# Patient Record
Sex: FEMALE | Race: WHITE | Employment: OTHER
[De-identification: names, ages, dates, MRNs, and addresses within clinical notes are randomized per-mention and may not be internally consistent; named-entity substitution may affect disease eponyms.]

---

## 2022-03-19 PROBLEM — R42 DIZZINESS: Status: ACTIVE | Noted: 2017-07-28

## 2022-03-19 PROBLEM — Z76.89 ESTABLISHING CARE WITH NEW DOCTOR, ENCOUNTER FOR: Status: ACTIVE | Noted: 2021-06-16

## 2022-03-19 PROBLEM — Z78.0 POST-MENOPAUSAL: Status: ACTIVE | Noted: 2021-06-16

## 2022-03-19 PROBLEM — I10 ESSENTIAL HYPERTENSION: Status: ACTIVE | Noted: 2017-07-28

## 2022-06-14 DIAGNOSIS — Z00.00 LABORATORY EXAM ORDERED AS PART OF ROUTINE GENERAL MEDICAL EXAMINATION: ICD-10-CM

## 2022-06-14 DIAGNOSIS — Z78.0 POST-MENOPAUSAL: Primary | ICD-10-CM

## 2022-06-14 DIAGNOSIS — E78.2 MIXED HYPERLIPIDEMIA: ICD-10-CM

## 2022-06-17 DIAGNOSIS — B00.1 FEVER BLISTER: Primary | ICD-10-CM

## 2022-06-17 RX ORDER — ACYCLOVIR 400 MG/1
400 TABLET ORAL 3 TIMES DAILY
Qty: 15 TABLET | Refills: 1 | Status: SHIPPED | OUTPATIENT
Start: 2022-06-17 | End: 2022-06-22

## 2022-06-21 ENCOUNTER — NURSE ONLY (OUTPATIENT)
Dept: INTERNAL MEDICINE CLINIC | Facility: CLINIC | Age: 57
End: 2022-06-21

## 2022-06-21 DIAGNOSIS — Z00.00 LABORATORY EXAM ORDERED AS PART OF ROUTINE GENERAL MEDICAL EXAMINATION: ICD-10-CM

## 2022-06-21 DIAGNOSIS — Z78.0 POST-MENOPAUSAL: ICD-10-CM

## 2022-06-21 DIAGNOSIS — E78.2 MIXED HYPERLIPIDEMIA: ICD-10-CM

## 2022-06-21 LAB
ALBUMIN SERPL-MCNC: 4.3 G/DL (ref 3.5–5)
ALBUMIN/GLOB SERPL: 1.3 {RATIO} (ref 1.2–3.5)
ALP SERPL-CCNC: 106 U/L (ref 50–136)
ALT SERPL-CCNC: 50 U/L (ref 12–65)
ANION GAP SERPL CALC-SCNC: 5 MMOL/L (ref 7–16)
APPEARANCE UR: CLEAR
AST SERPL-CCNC: 17 U/L (ref 15–37)
BACTERIA URNS QL MICRO: 0 /HPF
BASOPHILS # BLD: 0.1 K/UL (ref 0–0.2)
BASOPHILS NFR BLD: 1 % (ref 0–2)
BILIRUB SERPL-MCNC: 0.6 MG/DL (ref 0.2–1.1)
BILIRUB UR QL: NEGATIVE
BUN SERPL-MCNC: 9 MG/DL (ref 6–23)
CALCIUM SERPL-MCNC: 9.4 MG/DL (ref 8.3–10.4)
CASTS URNS QL MICRO: 0 /LPF
CHLORIDE SERPL-SCNC: 104 MMOL/L (ref 98–107)
CHOLEST SERPL-MCNC: 214 MG/DL
CO2 SERPL-SCNC: 30 MMOL/L (ref 21–32)
COLOR UR: NORMAL
CREAT SERPL-MCNC: 0.9 MG/DL (ref 0.6–1)
CRYSTALS URNS QL MICRO: 0 /LPF
DIFFERENTIAL METHOD BLD: ABNORMAL
EOSINOPHIL # BLD: 0.1 K/UL (ref 0–0.8)
EOSINOPHIL NFR BLD: 2 % (ref 0.5–7.8)
EPI CELLS #/AREA URNS HPF: NORMAL /HPF
ERYTHROCYTE [DISTWIDTH] IN BLOOD BY AUTOMATED COUNT: 12.2 % (ref 11.9–14.6)
GLOBULIN SER CALC-MCNC: 3.4 G/DL (ref 2.3–3.5)
GLUCOSE SERPL-MCNC: 98 MG/DL (ref 65–100)
GLUCOSE UR STRIP.AUTO-MCNC: NEGATIVE MG/DL
HCT VFR BLD AUTO: 47.4 % (ref 35.8–46.3)
HDLC SERPL-MCNC: 34 MG/DL (ref 40–60)
HDLC SERPL: 6.3 {RATIO}
HGB BLD-MCNC: 14.7 G/DL (ref 11.7–15.4)
HGB UR QL STRIP: NEGATIVE
IMM GRANULOCYTES # BLD AUTO: 0 K/UL (ref 0–0.5)
IMM GRANULOCYTES NFR BLD AUTO: 0 % (ref 0–5)
KETONES UR QL STRIP.AUTO: NEGATIVE MG/DL
LDLC SERPL CALC-MCNC: 134.8 MG/DL
LEUKOCYTE ESTERASE UR QL STRIP.AUTO: NEGATIVE
LYMPHOCYTES # BLD: 1.8 K/UL (ref 0.5–4.6)
LYMPHOCYTES NFR BLD: 24 % (ref 13–44)
MCH RBC QN AUTO: 28.7 PG (ref 26.1–32.9)
MCHC RBC AUTO-ENTMCNC: 31 G/DL (ref 31.4–35)
MCV RBC AUTO: 92.6 FL (ref 79.6–97.8)
MONOCYTES # BLD: 0.4 K/UL (ref 0.1–1.3)
MONOCYTES NFR BLD: 6 % (ref 4–12)
MUCOUS THREADS URNS QL MICRO: 0 /LPF
NEUTS SEG # BLD: 5 K/UL (ref 1.7–8.2)
NEUTS SEG NFR BLD: 67 % (ref 43–78)
NITRITE UR QL STRIP.AUTO: NEGATIVE
NRBC # BLD: 0 K/UL (ref 0–0.2)
OTHER OBSERVATIONS: NORMAL
PH UR STRIP: 7.5 [PH] (ref 5–9)
PLATELET # BLD AUTO: 332 K/UL (ref 150–450)
PMV BLD AUTO: 11.5 FL (ref 9.4–12.3)
POTASSIUM SERPL-SCNC: 4.3 MMOL/L (ref 3.5–5.1)
PROT SERPL-MCNC: 7.7 G/DL (ref 6.3–8.2)
PROT UR STRIP-MCNC: NEGATIVE MG/DL
RBC # BLD AUTO: 5.12 M/UL (ref 4.05–5.2)
RBC #/AREA URNS HPF: 0 /HPF
SODIUM SERPL-SCNC: 139 MMOL/L (ref 136–145)
SP GR UR REFRACTOMETRY: 1.01 (ref 1–1.02)
TRIGL SERPL-MCNC: 226 MG/DL (ref 35–150)
TSH, 3RD GENERATION: 1.26 UIU/ML (ref 0.36–3.74)
URINE CULTURE IF INDICATED: NORMAL
UROBILINOGEN UR QL STRIP.AUTO: 0.2 EU/DL (ref 0.2–1)
VLDLC SERPL CALC-MCNC: 45.2 MG/DL (ref 6–23)
WBC # BLD AUTO: 7.4 K/UL (ref 4.3–11.1)
WBC URNS QL MICRO: NORMAL /HPF

## 2022-06-28 ENCOUNTER — OFFICE VISIT (OUTPATIENT)
Dept: INTERNAL MEDICINE CLINIC | Facility: CLINIC | Age: 57
End: 2022-06-28
Payer: COMMERCIAL

## 2022-06-28 VITALS
WEIGHT: 178 LBS | TEMPERATURE: 98.6 F | HEIGHT: 62 IN | DIASTOLIC BLOOD PRESSURE: 84 MMHG | BODY MASS INDEX: 32.76 KG/M2 | HEART RATE: 76 BPM | SYSTOLIC BLOOD PRESSURE: 130 MMHG | OXYGEN SATURATION: 98 %

## 2022-06-28 DIAGNOSIS — I10 ESSENTIAL HYPERTENSION: ICD-10-CM

## 2022-06-28 DIAGNOSIS — Z82.79 FAMILY HISTORY OF NEUROFIBROMATOSIS: ICD-10-CM

## 2022-06-28 DIAGNOSIS — F40.240 CLAUSTROPHOBIA: ICD-10-CM

## 2022-06-28 DIAGNOSIS — H90.5 SENSORINEURAL HEARING LOSS (SNHL) OF RIGHT EAR, UNSPECIFIED HEARING STATUS ON CONTRALATERAL SIDE: ICD-10-CM

## 2022-06-28 DIAGNOSIS — Z12.31 ENCOUNTER FOR SCREENING MAMMOGRAM FOR BREAST CANCER: Primary | ICD-10-CM

## 2022-06-28 DIAGNOSIS — R42 VERTIGO: ICD-10-CM

## 2022-06-28 DIAGNOSIS — E78.2 MIXED HYPERLIPIDEMIA: ICD-10-CM

## 2022-06-28 PROCEDURE — 99214 OFFICE O/P EST MOD 30 MIN: CPT | Performed by: INTERNAL MEDICINE

## 2022-06-28 RX ORDER — ESTRADIOL 0.1 MG/D
1 FILM, EXTENDED RELEASE TRANSDERMAL
Qty: 8 PATCH | Refills: 3 | Status: SHIPPED | OUTPATIENT
Start: 2022-06-30

## 2022-06-28 RX ORDER — DIAZEPAM 2 MG/1
TABLET ORAL
Qty: 2 TABLET | Refills: 0 | Status: SHIPPED | OUTPATIENT
Start: 2022-06-28 | End: 2022-12-28

## 2022-06-28 RX ORDER — CETIRIZINE HYDROCHLORIDE 10 MG/1
TABLET ORAL
COMMUNITY

## 2022-06-28 ASSESSMENT — ENCOUNTER SYMPTOMS
SHORTNESS OF BREATH: 0
CHEST TIGHTNESS: 0

## 2022-06-28 NOTE — PROGRESS NOTES
ASSESSMENT/PLAN:         Evaluation and management of the chronic condition(s) delineated. No negative side effects reported. I have reviewed all the lab results. There are some abnormalities that are either expected or not critical to the patient's health, and are discussed in the office today and are addressed. Please refer to the above assessement and plan narrative and orders and follow up plan. Medication discussed and refilled as needed. Physical exam findings are stable unless otherwise indicated and this is addressed. The most recent lab work and imaging and consultant/urgent care visits and imaging are reviewed and discussed and considered during this visit encounter. 1. Encounter for screening mammogram for breast cancer     - SIMONE Digital Screen Bilateral [[id number]]; Future    2. Essential hypertension  Treatment regimen is effective. 3. Mixed hyperlipidemia  Declines statin    4. Sensorineural hearing loss (SNHL) of right ear, unspecified hearing status on contralateral side  Eval mri due to vertigo and right hearing loss and sister with Neurofibromatosis and acoustic neuroma. Evidence based review could consider genetic testing. She is not interested at this time.      - MRI brain with contrast; Future    5. Family history of neurofibromatosis     - MRI brain with contrast; Future    6. Vertigo     - MRI brain with contrast; Future    7. Claustrophobia     - diazePAM (VALIUM) 2 MG tablet; Take 1 hour prior to MRI and may repeat once for MRI claustrophobia  Dispense: 2 tablet; Refill: 0           On this date, 6/28/22, I have spent 30 minutes reviewing previous notes, test results and face to face with the patient discussing the diagnosis and importance of compliance with the treatment plan as well as documenting on the day of the visit. An electronic signature was used to authenticate this note.   -- Kelsea Reyes MD     Return in about 6 months (around 2022). SUBJECTIVE/OBJECTIVE:  Chief Complaint   Patient presents with    Hypertension    Cholesterol Problem    Results       HPI:   This is a  Established patientAna Doshi (: 1965 is a 60 y.o. female, here for evaluation of the following chief complaint(s):   Chief Complaint   Patient presents with    Hypertension    Cholesterol Problem    Results       Patient is here for follow-up and management of chronic medical conditions, review of recent labs, review of any imaging completed since our last office visit and discuss any consultants opinions or management changes. Here for medication refills and lab review. Not taking statin as prescribed. Father is living with her. His grief is a concern for her since her mother passed. Sister diagnosed with a brain tumor. Ongoing evaluation. This is a concern for her. She is now being treated at [facility]. Recently retired from [employer]. I had the pleasure of taking care of her mother prior to her passing. She is now pursuing some part-time work as a . Previously has seen Dr. Sagrario Pastor. She has a history of occasional vertigo in the right ear. Has seen ENT. Mild hearing changes. Sister with Acoustic neuroma and another family member with NFT. Stable on hormone patch. Prior hysterectomy. Released by gynecology follow-up as needed. Coronary calcium score of 0. Baseline cardiac evaluation has been done with [facility]    Completed Covid vaccine. Booster recommended. Labs reviewed and discussed and medication refilled as needed for chronic medications during ov or adjusted based on lab results and/or our discussion as appropriate. See discussion. The patient's available records and electronic chart records are reviewed.   The PMH, PSH, medications, allergies, medications, FH, health maintenance and vaccination status are all reviewed and updated as appropriate. Records from outside providers have been reviewed, summarized, and considered as noted in the history of present illness, past medical history, and objective data of this note and encounter. Health Maintenance   Topic Date Due    Breast cancer screen  Never done    Shingles vaccine (1 of 2) Never done    COVID-19 Vaccine (3 - Booster for Pfizer series) 09/06/2021    Hepatitis C screen  06/28/2023 (Originally 9/11/1983)    Flu vaccine (Season Ended) 09/01/2022    Depression Screen  12/16/2022    Lipids  06/21/2023    Colorectal Cancer Screen  08/28/2027    DTaP/Tdap/Td vaccine (2 - Td or Tdap) 06/07/2028    Hepatitis A vaccine  Aged Out    Hepatitis B vaccine  Aged Out    Hib vaccine  Aged Out    Meningococcal (ACWY) vaccine  Aged Out    Pneumococcal 0-64 years Vaccine  Aged Out    HIV screen  Discontinued    Cervical cancer screen  Discontinued     Patient Active Problem List   Diagnosis    Hyperlipidemia    Essential hypertension    Post-menopausal    Vertigo    Establishing care with new doctor, encounter for    Chest pain    Family history of neurofibromatosis    Claustrophobia       Review of Systems   Constitutional: Negative for activity change and fatigue. HENT: Positive for hearing loss. Eyes: Negative for visual disturbance. Respiratory: Negative for chest tightness and shortness of breath. Cardiovascular: Negative for chest pain, palpitations and leg swelling. Endocrine: Negative for polydipsia and polyuria. Genitourinary: Negative for dysuria. Musculoskeletal: Negative for myalgias. Skin: Negative for wound. Neurological: Positive for dizziness. Negative for headaches. Psychiatric/Behavioral: Negative for dysphoric mood.        Lab Results   Component Value Date    WBC 7.4 06/21/2022    HGB 14.7 06/21/2022    HCT 47.4 06/21/2022    MCV 92.6 06/21/2022    RDW 12.2 06/21/2022     06/21/2022    NEUTOPHILPCT 63 06/16/2021 LYMPHOPCT 24 06/21/2022    LYMPHOPCT 25 06/16/2021    MONOPCT 6 06/21/2022    MONOPCT 9 06/16/2021    EOSRELPCT 2 06/21/2022    BASOPCT 1 06/21/2022    BASOPCT 1 06/16/2021    LYMPHSABS 1.8 06/21/2022    LYMPHSABS 1.8 06/16/2021    MONOSABS 0.4 06/21/2022    MONOSABS 0.7 06/16/2021    EOSABS 0.1 06/21/2022    EOSABS 0.1 06/16/2021    BASOSABS 0.1 06/21/2022    IMMGRAN 0 06/21/2022    GRANULOCYTEABSOLUTECOUNT 0.0 06/16/2021       Lab Results   Component Value Date     06/21/2022    K 4.3 06/21/2022     06/21/2022    CO2 30 06/21/2022    ANIONGAP 5 06/21/2022    GLUCOSE 98 06/21/2022    BUN 9 06/21/2022    CREATININE 0.90 06/21/2022    GFRAA >60 06/21/2022    LABGLOM >60 06/21/2022    CALCIUM 9.4 06/21/2022    BILITOT 0.6 06/21/2022    ALT 50 06/21/2022    AST 17 06/21/2022    ALKPHOS 106 06/21/2022    ALKPHOS 91 06/16/2021    PROT 7.7 06/21/2022    LABALBU 4.3 06/21/2022    GLOB 3.4 06/21/2022    ALBUMIN 1.3 06/21/2022       Lab Results   Component Value Date    CHOL 214 06/21/2022    HDL 34 06/21/2022    TRIG 226 06/21/2022    LDLCALC 134.8 06/21/2022    VLDL 71 06/16/2021       No results found for: LABA1C    Lab Results   Component Value Date    TSH 1.580 06/16/2021       No results found for: PSA    Lab Results   Component Value Date    SPECGRAV 1.013 06/21/2022    COLORU YELLOW/STRAW 06/21/2022    LEUKOCYTESUR Negative 06/21/2022    PROTEINU Negative 06/21/2022    GLUCOSEU Negative 06/21/2022    KETUA Negative 06/21/2022    BLOODU Negative 06/21/2022    BILIRUBINUR Negative 06/21/2022    UROBILINOGEN 0.2 06/21/2022    NITRU Negative 06/21/2022           Vitals:    06/28/22 0949   BP: 130/84   Site: Left Upper Arm   Position: Sitting   Cuff Size: Small Adult   Pulse: 76   Temp: 98.6 °F (37 °C)   TempSrc: Temporal   SpO2: 98%   Weight: 178 lb (80.7 kg)   Height: 5' 2\" (1.575 m)     Wt Readings from Last 3 Encounters:   06/28/22 178 lb (80.7 kg)   12/16/21 186 lb (84.4 kg)   06/16/21 186 lb (84.4 kg) BP Readings from Last 3 Encounters:   06/28/22 130/84   12/16/21 136/80   06/16/21 126/82     Physical Examination:  General: Well Developed, Well Nourished, No Acute Distress  HEENT: pupils equal and round, no abnormalities noted  Neck: supple, no JVD, no carotid bruits  Heart: S1S2 with RRR without murmurs or gallops  Lungs: Clear throughout auscultation bilaterally  Abd: soft, nontender, nondistended  Ext: No edema distally  Skin: warm and dry  Psychiatric: Normal mood and affect  Neurologic: Alert and oriented X 3, cranial nerves II thru XII grossly intact and symmetric

## 2022-07-09 ENCOUNTER — HOSPITAL ENCOUNTER (OUTPATIENT)
Dept: MRI IMAGING | Age: 57
Discharge: HOME | End: 2022-07-12

## 2022-07-09 DIAGNOSIS — H90.5 SENSORINEURAL HEARING LOSS (SNHL) OF RIGHT EAR, UNSPECIFIED HEARING STATUS ON CONTRALATERAL SIDE: ICD-10-CM

## 2022-07-09 DIAGNOSIS — Z82.79 FAMILY HISTORY OF NEUROFIBROMATOSIS: ICD-10-CM

## 2022-07-09 DIAGNOSIS — R42 VERTIGO: ICD-10-CM

## 2022-07-09 NOTE — PROGRESS NOTES
Patient attempted MRI scan and felt like her valium did not work. Patient will contact her ordering provider to get rescheduled and determine next steps.

## 2022-12-06 DIAGNOSIS — Z00.00 LABORATORY EXAM ORDERED AS PART OF ROUTINE GENERAL MEDICAL EXAMINATION: ICD-10-CM

## 2022-12-06 DIAGNOSIS — E78.2 MIXED HYPERLIPIDEMIA: Primary | ICD-10-CM

## 2022-12-06 DIAGNOSIS — Z78.0 POST-MENOPAUSAL: ICD-10-CM

## 2023-01-09 ENCOUNTER — TELEPHONE (OUTPATIENT)
Dept: CARDIOLOGY CLINIC | Age: 58
End: 2023-01-09

## 2023-01-09 ENCOUNTER — TELEPHONE (OUTPATIENT)
Dept: INTERNAL MEDICINE CLINIC | Facility: CLINIC | Age: 58
End: 2023-01-09

## 2023-01-09 ENCOUNTER — APPOINTMENT (OUTPATIENT)
Dept: GENERAL RADIOLOGY | Age: 58
DRG: 247 | End: 2023-01-09

## 2023-01-09 ENCOUNTER — HOSPITAL ENCOUNTER (INPATIENT)
Age: 58
LOS: 1 days | Discharge: HOME | DRG: 247 | End: 2023-01-11
Attending: EMERGENCY MEDICINE | Admitting: FAMILY MEDICINE

## 2023-01-09 DIAGNOSIS — R07.9 CHEST PAIN: ICD-10-CM

## 2023-01-09 DIAGNOSIS — I16.1 HYPERTENSIVE EMERGENCY WITHOUT CONGESTIVE HEART FAILURE: Primary | ICD-10-CM

## 2023-01-09 DIAGNOSIS — R77.8 ELEVATED TROPONIN LEVEL: ICD-10-CM

## 2023-01-09 DIAGNOSIS — R74.8 ELEVATION OF CARDIAC ENZYMES: ICD-10-CM

## 2023-01-09 PROBLEM — I10 ESSENTIAL HYPERTENSION: Status: ACTIVE | Noted: 2017-07-28

## 2023-01-09 LAB
ALBUMIN SERPL-MCNC: 4.1 G/DL (ref 3.5–5)
ALBUMIN/GLOB SERPL: 1.1 (ref 0.4–1.6)
ALP SERPL-CCNC: 83 U/L (ref 50–136)
ALT SERPL-CCNC: 43 U/L (ref 12–65)
ANION GAP SERPL CALC-SCNC: 4 MMOL/L (ref 2–11)
AST SERPL-CCNC: 25 U/L (ref 15–37)
BASOPHILS # BLD: 0.1 K/UL (ref 0–0.2)
BASOPHILS NFR BLD: 1 % (ref 0–2)
BILIRUB SERPL-MCNC: 0.3 MG/DL (ref 0.2–1.1)
BILIRUB UR QL: NEGATIVE
BUN SERPL-MCNC: 14 MG/DL (ref 6–23)
CALCIUM SERPL-MCNC: 8.7 MG/DL (ref 8.3–10.4)
CHLORIDE SERPL-SCNC: 106 MMOL/L (ref 101–110)
CO2 SERPL-SCNC: 29 MMOL/L (ref 21–32)
CREAT SERPL-MCNC: 1 MG/DL (ref 0.6–1)
DIFFERENTIAL METHOD BLD: ABNORMAL
EOSINOPHIL # BLD: 0.2 K/UL (ref 0–0.8)
EOSINOPHIL NFR BLD: 1 % (ref 0.5–7.8)
ERYTHROCYTE [DISTWIDTH] IN BLOOD BY AUTOMATED COUNT: 11.7 % (ref 11.9–14.6)
GLOBULIN SER CALC-MCNC: 3.8 G/DL (ref 2.8–4.5)
GLUCOSE SERPL-MCNC: 105 MG/DL (ref 65–100)
GLUCOSE UR QL STRIP.AUTO: NEGATIVE MG/DL
HCT VFR BLD AUTO: 43.3 % (ref 35.8–46.3)
HGB BLD-MCNC: 14 G/DL (ref 11.7–15.4)
IMM GRANULOCYTES # BLD AUTO: 0 K/UL (ref 0–0.5)
IMM GRANULOCYTES NFR BLD AUTO: 0 % (ref 0–5)
KETONES UR-MCNC: NEGATIVE MG/DL
LEUKOCYTE ESTERASE UR QL STRIP: NEGATIVE
LYMPHOCYTES # BLD: 2.4 K/UL (ref 0.5–4.6)
LYMPHOCYTES NFR BLD: 21 % (ref 13–44)
MAGNESIUM SERPL-MCNC: 2.3 MG/DL (ref 1.8–2.4)
MCH RBC QN AUTO: 29.1 PG (ref 26.1–32.9)
MCHC RBC AUTO-ENTMCNC: 32.3 G/DL (ref 31.4–35)
MCV RBC AUTO: 90 FL (ref 82–102)
MONOCYTES # BLD: 0.7 K/UL (ref 0.1–1.3)
MONOCYTES NFR BLD: 6 % (ref 4–12)
NEUTS SEG # BLD: 8.3 K/UL (ref 1.7–8.2)
NEUTS SEG NFR BLD: 71 % (ref 43–78)
NITRITE UR QL: NEGATIVE
NRBC # BLD: 0 K/UL (ref 0–0.2)
PH UR: 6 (ref 5–9)
PHOSPHATE SERPL-MCNC: 3.2 MG/DL (ref 2.5–4.5)
PLATELET # BLD AUTO: 329 K/UL (ref 150–450)
PMV BLD AUTO: 11.9 FL (ref 9.4–12.3)
POTASSIUM SERPL-SCNC: 3.6 MMOL/L (ref 3.5–5.1)
PROT SERPL-MCNC: 7.9 G/DL (ref 6.3–8.2)
PROT UR QL: NEGATIVE MG/DL
RBC # BLD AUTO: 4.81 M/UL (ref 4.05–5.2)
RBC # UR STRIP: ABNORMAL
SERVICE CMNT-IMP: ABNORMAL
SODIUM SERPL-SCNC: 139 MMOL/L (ref 133–143)
SP GR UR: >1.03 (ref 1–1.02)
TROPONIN I SERPL HS-MCNC: 814 PG/ML (ref 0–14)
TROPONIN I SERPL HS-MCNC: 875 PG/ML (ref 0–14)
UROBILINOGEN UR QL: 0.2 EU/DL (ref 0.2–1)
WBC # BLD AUTO: 11.7 K/UL (ref 4.3–11.1)

## 2023-01-09 PROCEDURE — 84484 ASSAY OF TROPONIN QUANT: CPT

## 2023-01-09 PROCEDURE — 96375 TX/PRO/DX INJ NEW DRUG ADDON: CPT

## 2023-01-09 PROCEDURE — 96374 THER/PROPH/DIAG INJ IV PUSH: CPT

## 2023-01-09 PROCEDURE — 85025 COMPLETE CBC W/AUTO DIFF WBC: CPT

## 2023-01-09 PROCEDURE — 2580000003 HC RX 258: Performed by: EMERGENCY MEDICINE

## 2023-01-09 PROCEDURE — 93005 ELECTROCARDIOGRAM TRACING: CPT | Performed by: EMERGENCY MEDICINE

## 2023-01-09 PROCEDURE — 6360000002 HC RX W HCPCS: Performed by: EMERGENCY MEDICINE

## 2023-01-09 PROCEDURE — 6370000000 HC RX 637 (ALT 250 FOR IP): Performed by: EMERGENCY MEDICINE

## 2023-01-09 PROCEDURE — 84100 ASSAY OF PHOSPHORUS: CPT

## 2023-01-09 PROCEDURE — 71046 X-RAY EXAM CHEST 2 VIEWS: CPT

## 2023-01-09 PROCEDURE — 83735 ASSAY OF MAGNESIUM: CPT

## 2023-01-09 PROCEDURE — 81003 URINALYSIS AUTO W/O SCOPE: CPT

## 2023-01-09 PROCEDURE — 80053 COMPREHEN METABOLIC PANEL: CPT

## 2023-01-09 PROCEDURE — 99285 EMERGENCY DEPT VISIT HI MDM: CPT

## 2023-01-09 PROCEDURE — 2500000003 HC RX 250 WO HCPCS: Performed by: EMERGENCY MEDICINE

## 2023-01-09 RX ORDER — ACETAMINOPHEN 500 MG
1000 TABLET ORAL
Status: COMPLETED | OUTPATIENT
Start: 2023-01-09 | End: 2023-01-09

## 2023-01-09 RX ORDER — LORAZEPAM 2 MG/ML
1 INJECTION INTRAMUSCULAR ONCE
Status: COMPLETED | OUTPATIENT
Start: 2023-01-09 | End: 2023-01-09

## 2023-01-09 RX ADMIN — LORAZEPAM 1 MG: 2 INJECTION INTRAMUSCULAR; INTRAVENOUS at 22:33

## 2023-01-09 RX ADMIN — ACETAMINOPHEN 1000 MG: 500 TABLET ORAL at 21:28

## 2023-01-09 RX ADMIN — SODIUM CHLORIDE 5 MG/HR: 9 INJECTION, SOLUTION INTRAVENOUS at 21:28

## 2023-01-09 ASSESSMENT — ENCOUNTER SYMPTOMS
COUGH: 0
BACK PAIN: 0
SINUS PAIN: 0
DIARRHEA: 0
RESPIRATORY NEGATIVE: 1
EYE ITCHING: 0
ALLERGIC/IMMUNOLOGIC NEGATIVE: 1
WHEEZING: 0
EYES NEGATIVE: 1
TROUBLE SWALLOWING: 0
ABDOMINAL PAIN: 0
GASTROINTESTINAL NEGATIVE: 1
CONSTIPATION: 0
EYE REDNESS: 0
NAUSEA: 0
VOMITING: 0
SHORTNESS OF BREATH: 0
EYE PAIN: 0

## 2023-01-09 NOTE — ED TRIAGE NOTES
Pt to ED from home. Pt states cardiologist told her to come to ER for high blood pressure. Pt states 4 or 5 days ago she had an episode of \"feeling weird\" that was back and neck pain. She checked her BP during episode and her BP was 215/105. Pt states she has checked it daily since and episode with nothing relieving high BP in 200s, she called cardiology and they stated to come to ER. Pt c/o of slight HA in triage. Denies chest pain, SOB, NVD or dizziness. Pt denies hx of HTN. Pt states sees cardiologist due to extensive cardiac hx in family.

## 2023-01-09 NOTE — TELEPHONE ENCOUNTER
Patient called stating that she had elevated BP- 200/115. Patient was told no available appt today. Patient was advised to go to the ER for evaluation.  Patient voiced understanding

## 2023-01-09 NOTE — ED PROVIDER NOTES
Emergency Department Provider Note                   PCP:                Lawrence Mcardle, MD               Age: 59 y.o. Sex: female       ICD-10-CM    1. Hypertensive emergency without congestive heart failure  I16.1       2. Elevated troponin level  R77.8           DISPOSITION Decision To Admit 01/09/2023 10:05:22 PM       Medical Decision Making  78-year-old female patient here in the ER with concerns over elevated blood pressure readings  Patient has had a mild, fairly nondescript, headache but no other acute symptoms. Confirmed blood pressure 230/93 here in the ER    We will check labs EKG chest x-ray and urine and monitor blood pressure    8:51 PM  I reviewed an EKG there are no acute ischemic changes  Initial lab results show the patient has an elevated troponin at 850  Given her advanced hypertension and now signs of cardiac strain, I will institute a Cardene drip  Will touch base with her cardiologist to review the case as well. 10:06 PM  Patient with cardiology who feel that the elevated troponin is likely demand ischemia related to the significant elevation in her blood pressure. I agree with this. Will consult hospitalist service for admission    I have reviewed the patient's lab work and chest x-ray and EKG    Amount and/or Complexity of Data Reviewed  Labs: ordered. Radiology: ordered. ECG/medicine tests: ordered and independent interpretation performed. Risk  OTC drugs. Decision regarding hospitalization. Complexity of Problem: 2 or more stable chronic illnesses. (4)  Complexity of Problem: 1 acute illness with systemic symptoms. (4)  The patients assessment required an independent historian: I spoke with a family member. I have conducted an independent ordering and review of Labs. I have conducted an independent ordering and review of EKG. I have conducted an independent ordering and review of X-rays.   I have reviewed records from an external source: ED records from outside this hospital.  I have reviewed records from an external source: provider visit notes from PCP. Considerations: Shared decision making was utilized in the care of this patient. I discussed disposition with another provider. I discussed case with [facility] cardiology, Dr. Rheba Osgood  Patient was admitted I have communication with admitting physician. Orders Placed This Encounter   Procedures    Critical Care    XR CHEST (2 VW)    CBC with Auto Differential    Comprehensive Metabolic Panel    Magnesium    Troponin    Phosphorus    Troponin    Cardiac Monitor - ED Only    POCT Urine Dipstick    POCT Urinalysis no Micro    EKG 12 Lead        Medications   niCARdipine (CARDENE) 25 mg in sodium chloride 0.9 % 250 mL infusion ([id number]) (7.5 mg/hr IntraVENous Rate/Dose Change 1/9/23 2150)   acetaminophen (TYLENOL) tablet 1,000 mg (1,000 mg Oral Given 1/9/23 2128)       New Prescriptions    No medications on file        Vinnie Olvera is a 59 y.o. female who presents to the Emergency Department with chief complaint of    Chief Complaint   Patient presents with    Hypertension      25-year-old female patient presents to the ER with complaints of elevated blood pressure readings  Patient states that she is consistently found readings in the low 200s over 100 range  She is adamantly asymptomatic with these blood pressure readings  Reports a history of \"secondary\" hypertension related to other specific events that always resolved without needing pharmacologic intervention. Patient states she called her primary care doctor and cardiologist today both of whom advised her to come here to the ER for evaluation. At the time of our triage, she remains asymptomatic. She has not been dizzy or lightheaded. She has no chest pain or shortness of breath  She has had no hematuria or decreased urine output  No numbness weakness or other acute findings    The history is provided by the patient and a parent. Hypertension  Severity:  Severe  Onset quality:  Gradual  Duration:  3 days  Timing:  Constant  Progression:  Unchanged  Chronicity:  New  Context: herbal remedies    Context: normal sodium, not drug abuse and not noncompliance    Relieved by:  Nothing  Worsened by:  Nothing  Ineffective treatments:  Rest and diet  Associated symptoms: headaches    Associated symptoms: no abdominal pain, no anxiety, no chest pain, no dizziness, no ear pain, no epistaxis, no fatigue, no fever, no hematuria, no loss of consciousness, no nausea, no palpitations, no peripheral edema, no shortness of breath, no syncope and not vomiting       Review of Systems   Constitutional:  Negative for chills, fatigue and fever. HENT:  Negative for ear pain, hearing loss, nosebleeds, sinus pain, sneezing and trouble swallowing. Eyes:  Negative for pain, redness and itching. Respiratory:  Negative for cough, shortness of breath and wheezing. Cardiovascular:  Negative for chest pain, palpitations and syncope. Gastrointestinal:  Negative for abdominal pain, constipation, diarrhea, nausea and vomiting. Endocrine: Negative for polydipsia, polyphagia and polyuria. Genitourinary:  Negative for dysuria, flank pain, frequency and hematuria. Musculoskeletal:  Negative for arthralgias, back pain and myalgias. Skin:  Negative for rash and wound. Allergic/Immunologic: Negative for food allergies and immunocompromised state. Neurological:  Positive for headaches. Negative for dizziness, loss of consciousness, syncope, speech difficulty and light-headedness. Hematological:  Negative for adenopathy. Does not bruise/bleed easily. Psychiatric/Behavioral:  Negative for dysphoric mood and self-injury. The patient is not nervous/anxious. All other systems reviewed and are negative.     Past Medical History:   Diagnosis Date    Dizziness 7/28/2017    Ear problems     Hearing reduced     Hyperlipidemia 12/9/2015    Other ill-defined conditions(799.89)     Syncope with sight of blood        Past Surgical History:   Procedure Laterality Date    HYSTERECTOMY (CERVIX STATUS UNKNOWN)          Family History   Problem Relation Age of Onset    Elevated Lipids Mother     Hypertension Mother     Coronary Art Dis Father         premature     Coronary Art Dis Mother         Social History     Socioeconomic History    Marital status: Single   Tobacco Use    Smoking status: Former     Types: Cigarettes     Quit date: 1991     Years since quittin.5    Smokeless tobacco: Never   Vaping Use    Vaping Use: Never used   Substance and Sexual Activity    Alcohol use: No    Drug use: No        Allergies: Ciprofloxacin, Nitrofurantoin, Sulfa antibiotics, Cimetidine, and Clindamycin hcl    Previous Medications    ASCORBIC ACID (VITAMIN C) 500 MG TABLET    Take by mouth    CETIRIZINE (ZYRTEC) 10 MG TABLET    Take by mouth    CLORAZEPATE (TRANXENE) 7.5 MG TABLET    Take 7.5 mg by mouth 2 times daily. ESTRADIOL (VIVELLE) 0.1 MG/24HR    Place 1 patch onto the skin Twice a Week    ROSUVASTATIN (CRESTOR) 10 MG TABLET    Take 10 mg by mouth        Vitals signs and nursing note reviewed. Patient Vitals for the past 4 hrs:   Temp Pulse Resp BP SpO2   23 2145 -- 70 18 (!) 229/92 95 %   23 2047 98.4 °F (36.9 °C) 60 20 (!) 218/83 98 %          Physical Exam  Vitals and nursing note reviewed. Constitutional:       General: She is in acute distress. Appearance: Normal appearance. She is obese. HENT:      Head: Normocephalic and atraumatic. Right Ear: External ear normal.      Left Ear: External ear normal.      Nose: Nose normal.      Mouth/Throat:      Mouth: Mucous membranes are moist.      Pharynx: Oropharynx is clear. Eyes:      General: No scleral icterus. Extraocular Movements: Extraocular movements intact. Conjunctiva/sclera: Conjunctivae normal.      Pupils: Pupils are equal, round, and reactive to light. Cardiovascular:      Rate and Rhythm: Normal rate and regular rhythm. Pulses: Normal pulses. Heart sounds: Normal heart sounds. Pulmonary:      Effort: Pulmonary effort is normal. No respiratory distress. Breath sounds: Normal breath sounds. Abdominal:      General: Abdomen is flat. Bowel sounds are normal. There is no distension. Palpations: Abdomen is soft. There is no mass. Tenderness: There is no abdominal tenderness. Musculoskeletal:         General: No deformity or signs of injury. Normal range of motion. Cervical back: Normal range of motion and neck supple. Skin:     General: Skin is warm and dry. Capillary Refill: Capillary refill takes less than 2 seconds. Neurological:      General: No focal deficit present. Mental Status: She is alert and oriented to person, place, and time. Psychiatric:         Mood and Affect: Mood normal.         Behavior: Behavior normal.         Thought Content: Thought content normal.         Judgment: Judgment normal.        EKG 12 Lead    Date/Time: 1/9/2023 7:47 PM  Performed by: Lana Councilman, MD  Authorized by: Lana Councilman, MD     ECG reviewed by ED Physician in the absence of a cardiologist: yes    Previous ECG:     Previous ECG:  Unavailable  Interpretation:     Interpretation: non-specific    Rate:     ECG rate:  67    ECG rate assessment: normal    Rhythm:     Rhythm: sinus rhythm    Ectopy:     Ectopy: none    QRS:     QRS axis:  Indeterminate    QRS intervals:   Wide    QRS conduction: RBBB    ST segments:     ST segments:  Normal  T waves:     T waves: normal    Comments:      No acute ischemic changes  No prior tracings available for comparison  Critical Care  Performed by: Lana Councilman, MD  Authorized by: Lana Councilman, MD     Critical care provider statement:     Critical care time (minutes):  70    Critical care time was exclusive of:  Separately billable procedures and treating other patients    Critical care was necessary to treat or prevent imminent or life-threatening deterioration of the following conditions:  Cardiac failure    Critical care was time spent personally by me on the following activities:  Blood draw for specimens, discussions with consultants, development of treatment plan with patient or surrogate, evaluation of patient's response to treatment, examination of patient, obtaining history from patient or surrogate, ordering and performing treatments and interventions, ordering and review of laboratory studies, ordering and review of radiographic studies, pulse oximetry, re-evaluation of patient's condition and review of old charts    I assumed direction of critical care for this patient from another provider in my specialty: no      Care discussed with: admitting provider      ED EKG Interpretation  EKG was interpreted in the absence of a cardiologist.      Results for orders placed or performed during the hospital encounter of 01/09/23   XR CHEST (2 VW)    Narrative    PA LATERAL CHEST  1/9/2023 7:07 PM     HISTORY: Chest pain  ; hypertension    COMPARISON: None    FINDINGS:  The heart size is within normal limits. There is no lobar  consolidation, pleural effusions or pulmonary edema. Impression    No consolidation.      CBC with Auto Differential   Result Value Ref Range    WBC 11.7 (H) 4.3 - 11.1 K/uL    RBC 4.81 4.05 - 5.2 M/uL    Hemoglobin 14.0 11.7 - 15.4 g/dL    Hematocrit 43.3 35.8 - 46.3 %    MCV 90.0 82 - 102 FL    MCH 29.1 26.1 - 32.9 PG    MCHC 32.3 31.4 - 35.0 g/dL    RDW 11.7 (L) 11.9 - 14.6 %    Platelets 587 971 - 522 K/uL    MPV 11.9 9.4 - 12.3 FL    nRBC 0.00 0.0 - 0.2 K/uL    Differential Type AUTOMATED      Seg Neutrophils 71 43 - 78 %    Lymphocytes 21 13 - 44 %    Monocytes 6 4.0 - 12.0 %    Eosinophils % 1 0.5 - 7.8 %    Basophils 1 0.0 - 2.0 %    Immature Granulocytes 0 0.0 - 5.0 %    Segs Absolute 8.3 (H) 1.7 - 8.2 K/UL    Absolute Lymph # 2.4 0.5 - 4.6 K/UL Absolute Mono # 0.7 0.1 - 1.3 K/UL    Absolute Eos # 0.2 0.0 - 0.8 K/UL    Basophils Absolute 0.1 0.0 - 0.2 K/UL    Absolute Immature Granulocyte 0.0 0.0 - 0.5 K/UL   Comprehensive Metabolic Panel   Result Value Ref Range    Sodium 139 133 - 143 mmol/L    Potassium 3.6 3.5 - 5.1 mmol/L    Chloride 106 101 - 110 mmol/L    CO2 29 21 - 32 mmol/L    Anion Gap 4 2 - 11 mmol/L    Glucose 105 (H) 65 - 100 mg/dL    BUN 14 6 - 23 MG/DL    Creatinine 1.00 0.6 - 1.0 MG/DL    Est, Glom Filt Rate >60 >60 ml/min/1.73m2    Calcium 8.7 8.3 - 10.4 MG/DL    Total Bilirubin 0.3 0.2 - 1.1 MG/DL    ALT 43 12 - 65 U/L    AST 25 15 - 37 U/L    Alk Phosphatase 83 50 - 136 U/L    Total Protein 7.9 6.3 - 8.2 g/dL    Albumin 4.1 3.5 - 5.0 g/dL    Globulin 3.8 2.8 - 4.5 g/dL    Albumin/Globulin Ratio 1.1 0.4 - 1.6     Magnesium   Result Value Ref Range    Magnesium 2.3 1.8 - 2.4 mg/dL   Phosphorus   Result Value Ref Range    Phosphorus 3.2 2.5 - 4.5 MG/DL   Troponin   Result Value Ref Range    Troponin, High Sensitivity 875.0 (HH) 0 - 14 pg/mL   POCT Urinalysis no Micro   Result Value Ref Range    Specific Gravity, Urine, POC >1.030 (H) 1.001 - 1.023    pH, Urine, POC 6.0 5.0 - 9.0      Protein, Urine, POC Negative NEG mg/dL    Glucose, UA POC Negative NEG mg/dL    Ketones, Urine, POC Negative NEG mg/dL    Bilirubin, Urine, POC Negative NEG      Blood, UA POC MODERATE (A) NEG      URINE UROBILINOGEN POC 0.2 0.2 - 1.0 EU/dL    Nitrate, Urine, POC Negative NEG      Leukocyte Est, UA POC Negative NEG      Performed by: Rita Davis    EKG 12 Lead   Result Value Ref Range    Ventricular Rate 67 BPM    Atrial Rate 67 BPM    P-R Interval 164 ms    QRS Duration 92 ms    Q-T Interval 412 ms    QTc Calculation (Bazett) 435 ms    P Axis 61 degrees    R Axis 25 degrees    T Axis 46 degrees    Diagnosis Normal sinus rhythm         XR CHEST (2 VW)   Final Result   No consolidation.                                Voice dictation software was used during the making of this note. This software is not perfect and grammatical and other typographical errors may be present. This note has not been completely proofread for errors.      Anabell Ruggiero MD  01/09/23 0067

## 2023-01-09 NOTE — Clinical Note
Alert and oriented x4. VSS. Pain controled with norco, Up with one and walker. Dressing is CDI. CMS intact.    Catheter inserted.

## 2023-01-09 NOTE — Clinical Note
Contrast Dose Calculator:   Patient's age: 59.   Patient's sex: Female. Patient weight (kg) = 80. Creatinine level (mg/dL) = 1. Creatinine clearance (mL/min): 77.   Contrast concentration (mg/mL) = 370. MACD = 300 mL. Max Contrast dose per Creatinine Cl calculator = 173.25 mL.

## 2023-01-09 NOTE — TELEPHONE ENCOUNTER
Pt states her BP has been 200's /100 x 4 days and will not come down. Usually BP is normal; has not been high before. Denies symptoms. Triage informed pt BP is too high. Pt last seen in 2017. Triage advised pt to proceed to [facility] immediately for evaluation. Pt verbalizes understanding and agrees to plan.

## 2023-01-09 NOTE — TELEPHONE ENCOUNTER
Pt's BP has been elevated for 4-5 days and has been unable to get it to come down. States her BP today is 223/100. Denies SOB and CP. States she \"feels fine\" but isn't sure what to do. Pt states she checks her BP with an upper arm cuff.

## 2023-01-10 ENCOUNTER — APPOINTMENT (OUTPATIENT)
Dept: CT IMAGING | Age: 58
DRG: 247 | End: 2023-01-10

## 2023-01-10 ENCOUNTER — APPOINTMENT (OUTPATIENT)
Dept: NON INVASIVE DIAGNOSTICS | Age: 58
DRG: 247 | End: 2023-01-10
Payer: COMMERCIAL

## 2023-01-10 PROBLEM — D72.829 LEUKOCYTOSIS: Status: ACTIVE | Noted: 2023-01-10

## 2023-01-10 PROBLEM — I24.8 DEMAND ISCHEMIA (HCC): Status: ACTIVE | Noted: 2023-01-10

## 2023-01-10 PROBLEM — I24.89 DEMAND ISCHEMIA: Status: ACTIVE | Noted: 2023-01-10

## 2023-01-10 LAB
ANION GAP SERPL CALC-SCNC: 7 MMOL/L (ref 2–11)
BUN SERPL-MCNC: 13 MG/DL (ref 6–23)
CALCIUM SERPL-MCNC: 9.2 MG/DL (ref 8.3–10.4)
CHLORIDE SERPL-SCNC: 105 MMOL/L (ref 101–110)
CHOLEST SERPL-MCNC: 248 MG/DL
CO2 SERPL-SCNC: 29 MMOL/L (ref 21–32)
CREAT SERPL-MCNC: 0.8 MG/DL (ref 0.6–1)
ECHO AO ASC DIAM: 2.8 CM
ECHO AO ASCENDING AORTA INDEX: 1.54 CM/M2
ECHO AO ROOT DIAM: 3.1 CM
ECHO AO ROOT INDEX: 1.7 CM/M2
ECHO AV AREA PEAK VELOCITY: 2.4 CM2
ECHO AV AREA VTI: 2.4 CM2
ECHO AV AREA/BSA PEAK VELOCITY: 1.3 CM2/M2
ECHO AV AREA/BSA VTI: 1.3 CM2/M2
ECHO AV MEAN GRADIENT: 7 MMHG
ECHO AV MEAN VELOCITY: 1.3 M/S
ECHO AV PEAK GRADIENT: 11 MMHG
ECHO AV PEAK VELOCITY: 1.7 M/S
ECHO AV VELOCITY RATIO: 0.76
ECHO AV VTI: 35.6 CM
ECHO BSA: 1.87 M2
ECHO BSA: 1.87 M2
ECHO EST RA PRESSURE: 3 MMHG
ECHO IVC PROX: 1.3 CM
ECHO LA AREA 2C: 16.3 CM2
ECHO LA AREA 4C: 17.9 CM2
ECHO LA DIAMETER INDEX: 1.81 CM/M2
ECHO LA DIAMETER: 3.3 CM
ECHO LA MAJOR AXIS: 5.4 CM
ECHO LA MINOR AXIS: 4.6 CM
ECHO LA TO AORTIC ROOT RATIO: 1.06
ECHO LA VOL 2C: 47 ML (ref 22–52)
ECHO LA VOL 4C: 48 ML (ref 22–52)
ECHO LA VOL BP: 50 ML (ref 22–52)
ECHO LA VOL/BSA BIPLANE: 27 ML/M2 (ref 16–34)
ECHO LA VOLUME INDEX A2C: 26 ML/M2 (ref 16–34)
ECHO LA VOLUME INDEX A4C: 26 ML/M2 (ref 16–34)
ECHO LV E' LATERAL VELOCITY: 8 CM/S
ECHO LV E' SEPTAL VELOCITY: 8 CM/S
ECHO LV EDV A2C: 93 ML
ECHO LV EDV A4C: 109 ML
ECHO LV EDV INDEX A4C: 60 ML/M2
ECHO LV EDV NDEX A2C: 51 ML/M2
ECHO LV EJECTION FRACTION A2C: 59 %
ECHO LV EJECTION FRACTION A4C: 60 %
ECHO LV EJECTION FRACTION BIPLANE: 59 % (ref 55–100)
ECHO LV ESV A2C: 38 ML
ECHO LV ESV A4C: 44 ML
ECHO LV ESV INDEX A2C: 21 ML/M2
ECHO LV ESV INDEX A4C: 24 ML/M2
ECHO LV FRACTIONAL SHORTENING: 33 % (ref 28–44)
ECHO LV INTERNAL DIMENSION DIASTOLE INDEX: 2.69 CM/M2
ECHO LV INTERNAL DIMENSION DIASTOLIC: 4.9 CM (ref 3.9–5.3)
ECHO LV INTERNAL DIMENSION SYSTOLIC INDEX: 1.81 CM/M2
ECHO LV INTERNAL DIMENSION SYSTOLIC: 3.3 CM
ECHO LV IVSD: 1.1 CM (ref 0.6–0.9)
ECHO LV MASS 2D: 188.1 G (ref 67–162)
ECHO LV MASS INDEX 2D: 103.3 G/M2 (ref 43–95)
ECHO LV POSTERIOR WALL DIASTOLIC: 1 CM (ref 0.6–0.9)
ECHO LV RELATIVE WALL THICKNESS RATIO: 0.41
ECHO LVOT AREA: 3.1 CM2
ECHO LVOT AV VTI INDEX: 0.75
ECHO LVOT DIAM: 2 CM
ECHO LVOT MEAN GRADIENT: 3 MMHG
ECHO LVOT PEAK GRADIENT: 6 MMHG
ECHO LVOT PEAK VELOCITY: 1.3 M/S
ECHO LVOT STROKE VOLUME INDEX: 46.2 ML/M2
ECHO LVOT SV: 84.2 ML
ECHO LVOT VTI: 26.8 CM
ECHO MV A VELOCITY: 1.21 M/S
ECHO MV E DECELERATION TIME (DT): 232 MS
ECHO MV E VELOCITY: 1 M/S
ECHO MV E/A RATIO: 0.83
ECHO MV E/E' LATERAL: 12.5
ECHO MV E/E' RATIO (AVERAGED): 12.5
ECHO MV E/E' SEPTAL: 12.5
ECHO PV ACCELERATION TIME (AT): 116 MS
ECHO PV MAX VELOCITY: 1.2 M/S
ECHO PV PEAK GRADIENT: 6 MMHG
ECHO RV BASAL DIMENSION: 3.2 CM
ECHO RV FREE WALL PEAK S': 15 CM/S
ECHO RV INTERNAL DIMENSION: 3 CM
ECHO RV TAPSE: 2.2 CM (ref 1.7–?)
EKG ATRIAL RATE: 67 BPM
EKG DIAGNOSIS: NORMAL
EKG P AXIS: 61 DEGREES
EKG P-R INTERVAL: 164 MS
EKG Q-T INTERVAL: 412 MS
EKG QRS DURATION: 92 MS
EKG QTC CALCULATION (BAZETT): 435 MS
EKG R AXIS: 25 DEGREES
EKG T AXIS: 46 DEGREES
EKG VENTRICULAR RATE: 67 BPM
GLUCOSE SERPL-MCNC: 89 MG/DL (ref 65–100)
HDLC SERPL-MCNC: 34 MG/DL (ref 40–60)
HDLC SERPL: 7.3
LDLC SERPL CALC-MCNC: 164.2 MG/DL
LV EF: 58 %
LVEF MODALITY: ABNORMAL
POTASSIUM SERPL-SCNC: 3.7 MMOL/L (ref 3.5–5.1)
SODIUM SERPL-SCNC: 141 MMOL/L (ref 133–143)
TRIGL SERPL-MCNC: 249 MG/DL (ref 35–150)
VLDLC SERPL CALC-MCNC: 49.8 MG/DL (ref 6–23)

## 2023-01-10 PROCEDURE — C1894 INTRO/SHEATH, NON-LASER: HCPCS | Performed by: INTERNAL MEDICINE

## 2023-01-10 PROCEDURE — 4A023N7 MEASUREMENT OF CARDIAC SAMPLING AND PRESSURE, LEFT HEART, PERCUTANEOUS APPROACH: ICD-10-PCS | Performed by: INTERNAL MEDICINE

## 2023-01-10 PROCEDURE — 93458 L HRT ARTERY/VENTRICLE ANGIO: CPT | Performed by: INTERNAL MEDICINE

## 2023-01-10 PROCEDURE — 2500000003 HC RX 250 WO HCPCS: Performed by: FAMILY MEDICINE

## 2023-01-10 PROCEDURE — C1769 GUIDE WIRE: HCPCS | Performed by: INTERNAL MEDICINE

## 2023-01-10 PROCEDURE — C9600 PERC DRUG-EL COR STENT SING: HCPCS | Performed by: INTERNAL MEDICINE

## 2023-01-10 PROCEDURE — C1874 STENT, COATED/COV W/DEL SYS: HCPCS | Performed by: INTERNAL MEDICINE

## 2023-01-10 PROCEDURE — 93306 TTE W/DOPPLER COMPLETE: CPT

## 2023-01-10 PROCEDURE — 36415 COLL VENOUS BLD VENIPUNCTURE: CPT

## 2023-01-10 PROCEDURE — 93005 ELECTROCARDIOGRAM TRACING: CPT | Performed by: INTERNAL MEDICINE

## 2023-01-10 PROCEDURE — 6370000000 HC RX 637 (ALT 250 FOR IP): Performed by: INTERNAL MEDICINE

## 2023-01-10 PROCEDURE — 2709999900 HC NON-CHARGEABLE SUPPLY: Performed by: INTERNAL MEDICINE

## 2023-01-10 PROCEDURE — 027034Z DILATION OF CORONARY ARTERY, ONE ARTERY WITH DRUG-ELUTING INTRALUMINAL DEVICE, PERCUTANEOUS APPROACH: ICD-10-PCS | Performed by: INTERNAL MEDICINE

## 2023-01-10 PROCEDURE — 93306 TTE W/DOPPLER COMPLETE: CPT | Performed by: INTERNAL MEDICINE

## 2023-01-10 PROCEDURE — 2580000003 HC RX 258: Performed by: INTERNAL MEDICINE

## 2023-01-10 PROCEDURE — 74174 CTA ABD&PLVS W/CONTRAST: CPT

## 2023-01-10 PROCEDURE — B2111ZZ FLUOROSCOPY OF MULTIPLE CORONARY ARTERIES USING LOW OSMOLAR CONTRAST: ICD-10-PCS | Performed by: INTERNAL MEDICINE

## 2023-01-10 PROCEDURE — C1725 CATH, TRANSLUMIN NON-LASER: HCPCS | Performed by: INTERNAL MEDICINE

## 2023-01-10 PROCEDURE — 6370000000 HC RX 637 (ALT 250 FOR IP): Performed by: FAMILY MEDICINE

## 2023-01-10 PROCEDURE — 1100000000 HC RM PRIVATE

## 2023-01-10 PROCEDURE — 6360000002 HC RX W HCPCS: Performed by: INTERNAL MEDICINE

## 2023-01-10 PROCEDURE — 2500000003 HC RX 250 WO HCPCS: Performed by: INTERNAL MEDICINE

## 2023-01-10 PROCEDURE — 2580000003 HC RX 258: Performed by: FAMILY MEDICINE

## 2023-01-10 PROCEDURE — 99152 MOD SED SAME PHYS/QHP 5/>YRS: CPT | Performed by: INTERNAL MEDICINE

## 2023-01-10 PROCEDURE — C1887 CATHETER, GUIDING: HCPCS | Performed by: INTERNAL MEDICINE

## 2023-01-10 PROCEDURE — 99153 MOD SED SAME PHYS/QHP EA: CPT | Performed by: INTERNAL MEDICINE

## 2023-01-10 PROCEDURE — 6360000004 HC RX CONTRAST MEDICATION: Performed by: INTERNAL MEDICINE

## 2023-01-10 PROCEDURE — 80048 BASIC METABOLIC PNL TOTAL CA: CPT

## 2023-01-10 PROCEDURE — 80061 LIPID PANEL: CPT

## 2023-01-10 PROCEDURE — 6360000002 HC RX W HCPCS: Performed by: FAMILY MEDICINE

## 2023-01-10 DEVICE — STENT ONYXNG30022UX ONYX 3.00X22RX
Type: IMPLANTABLE DEVICE | Status: FUNCTIONAL
Brand: ONYX FRONTIER™

## 2023-01-10 RX ORDER — MIDAZOLAM HYDROCHLORIDE 1 MG/ML
INJECTION INTRAMUSCULAR; INTRAVENOUS PRN
Status: DISCONTINUED | OUTPATIENT
Start: 2023-01-10 | End: 2023-01-10 | Stop reason: HOSPADM

## 2023-01-10 RX ORDER — MORPHINE SULFATE 2 MG/ML
2 INJECTION, SOLUTION INTRAMUSCULAR; INTRAVENOUS
Status: DISCONTINUED | OUTPATIENT
Start: 2023-01-10 | End: 2023-01-11 | Stop reason: HOSPADM

## 2023-01-10 RX ORDER — HYDROCODONE BITARTRATE AND ACETAMINOPHEN 5; 325 MG/1; MG/1
1 TABLET ORAL EVERY 4 HOURS PRN
Status: DISCONTINUED | OUTPATIENT
Start: 2023-01-10 | End: 2023-01-11 | Stop reason: HOSPADM

## 2023-01-10 RX ORDER — ACETAMINOPHEN 325 MG/1
650 TABLET ORAL EVERY 4 HOURS PRN
Status: DISCONTINUED | OUTPATIENT
Start: 2023-01-10 | End: 2023-01-11 | Stop reason: HOSPADM

## 2023-01-10 RX ORDER — LORAZEPAM 2 MG/ML
0.5 INJECTION INTRAMUSCULAR EVERY 6 HOURS PRN
Status: DISCONTINUED | OUTPATIENT
Start: 2023-01-10 | End: 2023-01-11 | Stop reason: HOSPADM

## 2023-01-10 RX ORDER — NITROGLYCERIN 20 MG/100ML
INJECTION INTRAVENOUS PRN
Status: DISCONTINUED | OUTPATIENT
Start: 2023-01-10 | End: 2023-01-10 | Stop reason: HOSPADM

## 2023-01-10 RX ORDER — SODIUM CHLORIDE 0.9 % (FLUSH) 0.9 %
5-40 SYRINGE (ML) INJECTION EVERY 12 HOURS SCHEDULED
Status: DISCONTINUED | OUTPATIENT
Start: 2023-01-10 | End: 2023-01-11 | Stop reason: HOSPADM

## 2023-01-10 RX ORDER — AMLODIPINE BESYLATE 5 MG/1
5 TABLET ORAL EVERY 12 HOURS
Status: DISCONTINUED | OUTPATIENT
Start: 2023-01-10 | End: 2023-01-11 | Stop reason: HOSPADM

## 2023-01-10 RX ORDER — HYDRALAZINE HYDROCHLORIDE 20 MG/ML
20 INJECTION INTRAMUSCULAR; INTRAVENOUS EVERY 6 HOURS PRN
Status: DISCONTINUED | OUTPATIENT
Start: 2023-01-10 | End: 2023-01-11 | Stop reason: HOSPADM

## 2023-01-10 RX ORDER — POLYETHYLENE GLYCOL 3350 17 G/17G
17 POWDER, FOR SOLUTION ORAL DAILY PRN
Status: DISCONTINUED | OUTPATIENT
Start: 2023-01-10 | End: 2023-01-11 | Stop reason: HOSPADM

## 2023-01-10 RX ORDER — SODIUM CHLORIDE 0.9 % (FLUSH) 0.9 %
5-40 SYRINGE (ML) INJECTION PRN
Status: DISCONTINUED | OUTPATIENT
Start: 2023-01-10 | End: 2023-01-11 | Stop reason: HOSPADM

## 2023-01-10 RX ORDER — ONDANSETRON 2 MG/ML
4 INJECTION INTRAMUSCULAR; INTRAVENOUS EVERY 6 HOURS PRN
Status: DISCONTINUED | OUTPATIENT
Start: 2023-01-10 | End: 2023-01-11 | Stop reason: HOSPADM

## 2023-01-10 RX ORDER — ASPIRIN 81 MG/1
81 TABLET, CHEWABLE ORAL DAILY
Status: DISCONTINUED | OUTPATIENT
Start: 2023-01-10 | End: 2023-01-11 | Stop reason: HOSPADM

## 2023-01-10 RX ORDER — BIVALIRUDIN 250 MG/5ML
INJECTION, POWDER, LYOPHILIZED, FOR SOLUTION INTRAVENOUS PRN
Status: DISCONTINUED | OUTPATIENT
Start: 2023-01-10 | End: 2023-01-10 | Stop reason: HOSPADM

## 2023-01-10 RX ORDER — ONDANSETRON 4 MG/1
4 TABLET, ORALLY DISINTEGRATING ORAL EVERY 8 HOURS PRN
Status: DISCONTINUED | OUTPATIENT
Start: 2023-01-10 | End: 2023-01-11 | Stop reason: HOSPADM

## 2023-01-10 RX ORDER — ROSUVASTATIN CALCIUM 20 MG/1
20 TABLET, COATED ORAL NIGHTLY
Status: DISCONTINUED | OUTPATIENT
Start: 2023-01-10 | End: 2023-01-11 | Stop reason: HOSPADM

## 2023-01-10 RX ORDER — ACETAMINOPHEN 325 MG/1
650 TABLET ORAL EVERY 6 HOURS PRN
Status: DISCONTINUED | OUTPATIENT
Start: 2023-01-10 | End: 2023-01-10 | Stop reason: SDUPTHER

## 2023-01-10 RX ORDER — SODIUM CHLORIDE 9 MG/ML
INJECTION, SOLUTION INTRAVENOUS PRN
Status: DISCONTINUED | OUTPATIENT
Start: 2023-01-10 | End: 2023-01-11 | Stop reason: HOSPADM

## 2023-01-10 RX ORDER — CLONIDINE HYDROCHLORIDE 0.2 MG/1
0.1 TABLET ORAL EVERY 4 HOURS PRN
Status: DISCONTINUED | OUTPATIENT
Start: 2023-01-10 | End: 2023-01-11 | Stop reason: HOSPADM

## 2023-01-10 RX ORDER — HEPARIN SODIUM 200 [USP'U]/100ML
INJECTION, SOLUTION INTRAVENOUS CONTINUOUS PRN
Status: COMPLETED | OUTPATIENT
Start: 2023-01-10 | End: 2023-01-10

## 2023-01-10 RX ORDER — SODIUM CHLORIDE 0.9 % (FLUSH) 0.9 %
10 SYRINGE (ML) INJECTION
Status: COMPLETED | OUTPATIENT
Start: 2023-01-10 | End: 2023-01-10

## 2023-01-10 RX ORDER — LIDOCAINE HYDROCHLORIDE 10 MG/ML
INJECTION, SOLUTION INFILTRATION; PERINEURAL PRN
Status: DISCONTINUED | OUTPATIENT
Start: 2023-01-10 | End: 2023-01-10 | Stop reason: HOSPADM

## 2023-01-10 RX ORDER — LOSARTAN POTASSIUM 50 MG/1
50 TABLET ORAL EVERY 12 HOURS
Status: DISCONTINUED | OUTPATIENT
Start: 2023-01-10 | End: 2023-01-11 | Stop reason: HOSPADM

## 2023-01-10 RX ORDER — CARVEDILOL 3.12 MG/1
6.25 TABLET ORAL 2 TIMES DAILY WITH MEALS
Status: DISCONTINUED | OUTPATIENT
Start: 2023-01-10 | End: 2023-01-11

## 2023-01-10 RX ORDER — MORPHINE SULFATE 4 MG/ML
4 INJECTION, SOLUTION INTRAMUSCULAR; INTRAVENOUS
Status: DISCONTINUED | OUTPATIENT
Start: 2023-01-10 | End: 2023-01-11 | Stop reason: HOSPADM

## 2023-01-10 RX ORDER — ASPIRIN 81 MG/1
81 TABLET ORAL DAILY
Status: DISCONTINUED | OUTPATIENT
Start: 2023-01-11 | End: 2023-01-10 | Stop reason: SDUPTHER

## 2023-01-10 RX ORDER — ENOXAPARIN SODIUM 100 MG/ML
40 INJECTION SUBCUTANEOUS DAILY
Status: DISCONTINUED | OUTPATIENT
Start: 2023-01-10 | End: 2023-01-11

## 2023-01-10 RX ORDER — LORAZEPAM 0.5 MG/1
0.5 TABLET ORAL EVERY 6 HOURS PRN
Status: DISCONTINUED | OUTPATIENT
Start: 2023-01-10 | End: 2023-01-10

## 2023-01-10 RX ORDER — HYDRALAZINE HYDROCHLORIDE 20 MG/ML
10 INJECTION INTRAMUSCULAR; INTRAVENOUS EVERY 6 HOURS PRN
Status: DISCONTINUED | OUTPATIENT
Start: 2023-01-10 | End: 2023-01-10

## 2023-01-10 RX ORDER — HYDROCODONE BITARTRATE AND ACETAMINOPHEN 5; 325 MG/1; MG/1
2 TABLET ORAL EVERY 4 HOURS PRN
Status: DISCONTINUED | OUTPATIENT
Start: 2023-01-10 | End: 2023-01-11 | Stop reason: HOSPADM

## 2023-01-10 RX ORDER — ACETAMINOPHEN 650 MG/1
650 SUPPOSITORY RECTAL EVERY 6 HOURS PRN
Status: DISCONTINUED | OUTPATIENT
Start: 2023-01-10 | End: 2023-01-11 | Stop reason: HOSPADM

## 2023-01-10 RX ORDER — 0.9 % SODIUM CHLORIDE 0.9 %
100 INTRAVENOUS SOLUTION INTRAVENOUS
Status: COMPLETED | OUTPATIENT
Start: 2023-01-10 | End: 2023-01-10

## 2023-01-10 RX ADMIN — ASPIRIN 81 MG: 81 TABLET, CHEWABLE ORAL at 10:42

## 2023-01-10 RX ADMIN — LORAZEPAM 0.5 MG: 0.5 TABLET ORAL at 08:32

## 2023-01-10 RX ADMIN — SODIUM CHLORIDE 2.5 MG/HR: 9 INJECTION, SOLUTION INTRAVENOUS at 05:57

## 2023-01-10 RX ADMIN — SODIUM CHLORIDE, PRESERVATIVE FREE 10 ML: 5 INJECTION INTRAVENOUS at 20:14

## 2023-01-10 RX ADMIN — CARVEDILOL 6.25 MG: 3.12 TABLET, FILM COATED ORAL at 17:52

## 2023-01-10 RX ADMIN — LOSARTAN POTASSIUM 50 MG: 50 TABLET, FILM COATED ORAL at 10:04

## 2023-01-10 RX ADMIN — LORAZEPAM 0.5 MG: 2 INJECTION INTRAMUSCULAR; INTRAVENOUS at 10:41

## 2023-01-10 RX ADMIN — SODIUM CHLORIDE, PRESERVATIVE FREE 10 ML: 5 INJECTION INTRAVENOUS at 20:13

## 2023-01-10 RX ADMIN — SODIUM CHLORIDE 100 ML: 9 INJECTION, SOLUTION INTRAVENOUS at 10:50

## 2023-01-10 RX ADMIN — IOPAMIDOL 100 ML: 755 INJECTION, SOLUTION INTRAVENOUS at 10:49

## 2023-01-10 RX ADMIN — AMLODIPINE BESYLATE 5 MG: 5 TABLET ORAL at 10:04

## 2023-01-10 RX ADMIN — SODIUM CHLORIDE, PRESERVATIVE FREE 10 ML: 5 INJECTION INTRAVENOUS at 10:50

## 2023-01-10 RX ADMIN — CARVEDILOL 6.25 MG: 3.12 TABLET, FILM COATED ORAL at 08:32

## 2023-01-10 RX ADMIN — ROSUVASTATIN CALCIUM 20 MG: 20 TABLET, COATED ORAL at 20:12

## 2023-01-10 RX ADMIN — LOSARTAN POTASSIUM 50 MG: 50 TABLET, FILM COATED ORAL at 20:12

## 2023-01-10 RX ADMIN — AMLODIPINE BESYLATE 5 MG: 5 TABLET ORAL at 20:12

## 2023-01-10 RX ADMIN — SODIUM CHLORIDE, PRESERVATIVE FREE 10 ML: 5 INJECTION INTRAVENOUS at 10:04

## 2023-01-10 ASSESSMENT — PAIN SCALES - GENERAL
PAINLEVEL_OUTOF10: 0

## 2023-01-10 NOTE — PLAN OF CARE
Problem: Discharge Planning  Goal: Discharge to home or other facility with appropriate resources  Outcome: Progressing  Flowsheets (Taken 1/10/2023 4780)  Discharge to home or other facility with appropriate resources:   Identify barriers to discharge with patient and caregiver   Arrange for needed discharge resources and transportation as appropriate   Identify discharge learning needs (meds, wound care, etc)     Problem: Chronic Conditions and Co-morbidities  Goal: Patient's chronic conditions and co-morbidity symptoms are monitored and maintained or improved  Outcome: Progressing     Problem: Safety - Adult  Goal: Free from fall injury  Outcome: Progressing

## 2023-01-10 NOTE — INTERDISCIPLINARY ROUNDS
Multi-D Rounds/Checklist (leapfrog):  Lines: can any be removed?: None       DVT Prophylaxis: Ordered  Vent: N/A  Nutrition Ordered/appropriate: Ordered  Can antibiotics or other drugs be stopped? Is Nozin performed: N/A  Consults needed: None  A: Is pain control adequate? (has PRNs? Stop drip?) Yes  B: Sedation break and SBT? N/A  C: Is sedation choice appropriate? N/A  D: Delirium/CAM-ICU? No  E: Mobility goals/appropriateness? Yes  F: Family update and plan? Father is primary contact and is being updated daily by primary attending and nursing staff.     FELIX Arguello - NP

## 2023-01-10 NOTE — H&P
Hospitalist Progress Note   Admit Date:  2023  8:03 PM   Name:  Damari Duncan   Age:  59 y.o. Sex:  female  :  1965   MRN:  [id number]   Room:  [facility]/    Presenting Complaint: Hypertension     Reason(s) for Admission: Elevation of cardiac enzymes [R74.8]  Elevated troponin level [R77.8]  Hypertensive emergency without congestive heart failure [I16.1]  Hypertensive emergency [I16.1]     Hospital Course:     Damari Duncan is a 59 y.o. female with medical history of  HLP   Admitted with hypertensive emergency and chest pain. Admitted to ICU for IV cardene drip. Coreg started. Troponin elevated. Cardiology following. ECHO pending. Plans for [facility] on 1-10-23. CXR negative. CTA chest/AP pending. Discharge plans pending to home. Subjective & 24hr Events (01/10/23): Anxious about events, NPO, no chest pain or dyspnea,     Assessment & Plan:     Principal Problem:    Hypertensive emergency  Plan:     Essential hypertension  Plan:   On IV cardene weaing as tolerant   Started on oral coreg , norvasc, cozaar         Active Problems:    Elevation of cardiac enzymes  Plan:   Demand ischemia [facility])  Plan:   Cardiology following  ECHO pending   [facility] today  Followup CT chest/ abd/pelvis            Leukocytosis  Plan:   Trend lab  CXR, UA negative          Hyperlipidemia  Plan:       Hematuria: Will need followup        Anticipated discharge needs:      pending    Diet:  ADULT DIET; Regular; Low Fat/Low Chol/High Fiber/AMEENA  DVT PPx: lovenox  Code status: Full Code      Patient is critically ill. Without intervention, there is a high probability of acute organ impairment or life-threatening deterioration. Critical care interventions: see plan of care  Total critical care time spent: 35 minutes. Time is indicative of direct patient attendance at bedside and on the patient's floor nearby.   Includes time spent at bedside performing history and exam, performing chart review, discussing findings and treatment plan with patient and/or family, discussing patient with nursing staff, consultants and colleagues, and ordering/reviewing pertinent laboratory and radiographic evaluations. Time excludes procedures. CPT:  72470: First 30-74 minutes  66872: Each block of 30 min. beyond 76             Hospital Problems:  Principal Problem:    Hypertensive emergency  Active Problems:    Elevation of cardiac enzymes    Leukocytosis    Demand ischemia (HCC)    Hyperlipidemia    Essential hypertension  Resolved Problems:    * No resolved hospital problems.  *      Objective:   Patient Vitals for the past 24 hrs:   Temp Pulse Resp BP SpO2   01/10/23 0620 97.8 °F (36.6 °C) 63 23 (!) 166/80 98 %   01/10/23 0539 -- 58 13 (!) 148/81 95 %   01/10/23 0529 -- 59 11 (!) 144/77 96 %   01/10/23 0519 -- 69 15 (!) 144/75 95 %   01/10/23 0509 -- 69 25 (!) 152/81 93 %   01/10/23 0459 -- 59 14 (!) 149/82 93 %   01/10/23 0449 -- 58 13 (!) 143/77 93 %   01/10/23 0439 -- 60 15 (!) 141/83 92 %   01/10/23 0429 -- 61 17 (!) 148/80 94 %   01/10/23 0330 -- 65 13 (!) 152/77 96 %   01/10/23 0320 -- 66 14 128/71 95 %   01/10/23 0310 -- 64 14 (!) 141/71 96 %   01/10/23 0309 -- 66 14 134/72 97 %   01/10/23 0259 -- 62 16 134/69 92 %   01/10/23 0249 -- 60 11 134/76 95 %   01/10/23 0242 -- 62 13 (!) 141/75 95 %   01/10/23 0232 -- 62 11 (!) 140/72 95 %   01/10/23 0222 -- 73 15 136/74 94 %   01/10/23 0212 -- 64 15 127/66 94 %   01/10/23 0202 -- 62 11 137/71 92 %   01/10/23 0152 -- 62 11 132/74 94 %   01/10/23 0142 -- 65 17 133/72 91 %   01/10/23 0132 -- 65 10 119/74 93 %   01/10/23 0021 -- 78 12 (!) 146/84 91 %   01/10/23 0011 -- 82 13 (!) 152/81 97 %   01/10/23 0001 -- 80 15 (!) 151/82 92 %   01/09/23 2351 -- 76 12 (!) 147/80 92 %   01/09/23 2341 -- 75 13 (!) 154/94 95 %   01/09/23 2331 -- 76 10 (!) 158/85 95 %   01/09/23 2323 -- 77 13 (!) 166/84 97 %   01/09/23 2308 -- 77 11 (!) 163/82 97 %   01/09/23 2253 -- 78 10 (!) 140/78 96 % 01/09/23 2238 -- 74 10 (!) 167/81 95 %   01/09/23 2226 -- 78 14 (!) 172/86 93 %   01/09/23 2216 -- 68 12 (!) 167/84 96 %   01/09/23 2156 -- 79 13 (!) 197/101 96 %   01/09/23 2146 -- 68 17 (!) 196/91 96 %   01/09/23 2145 -- 70 18 (!) 229/92 95 %   01/09/23 2136 -- 58 12 (!) 229/92 99 %   01/09/23 2126 -- 60 12 (!) 245/98 98 %   01/09/23 2047 98.4 °F (36.9 °C) 60 20 (!) 218/83 98 %   01/09/23 1758 98.8 °F (37.1 °C) 68 -- (!) 231/93 97 %       Oxygen Therapy  SpO2: 98 %  Pulse Oximetry Type: Continuous  Pulse Oximeter Device Mode: Continuous  Pulse Oximeter Device Location: Left, Finger  O2 Device: None (Room air)  Oximetry Probe Site Changed: Yes  Skin Assessment: Clean, dry, & intact  Skin Protection for O2 Device: N/A    Estimated body mass index is 32.39 kg/m² as calculated from the following:    Height as of this encounter: 5' 2\" (1.575 m). Weight as of this encounter: 177 lb 1.6 oz (80.3 kg). No intake or output data in the 24 hours ending 01/10/23 0707      Physical Exam:     Blood pressure (!) 166/80, pulse 63, temperature 97.8 °F (36.6 °C), temperature source Oral, resp. rate 23, height 5' 2\" (1.575 m), weight 177 lb 1.6 oz (80.3 kg), SpO2 98 %. General:    Well nourished. Alert, no distress   CV:   RRR. No m/r/g. No jugular venous distension. No edema   Lungs:   CTAB. No wheezing, rhonchi, or rales. Symmetric expansion. Abdomen:   Soft, nontender, nondistended. Extremities: No cyanosis or clubbing. No edema  Skin:     No rashes and normal coloration. Warm and dry. Neuro:  grossly intact. Psych:  Normal mood and affect.       I have personally reviewed labs and tests showing:  Recent Labs:  Recent Results (from the past 48 hour(s))   EKG 12 Lead    Collection Time: 01/09/23  7:09 PM   Result Value Ref Range    Ventricular Rate 67 BPM    Atrial Rate 67 BPM    P-R Interval 164 ms    QRS Duration 92 ms    Q-T Interval 412 ms    QTc Calculation (Bazett) 435 ms    P Axis 61 degrees    R Axis 25 degrees    T Axis 46 degrees    Diagnosis Normal sinus rhythm    CBC with Auto Differential    Collection Time: 01/09/23  7:19 PM   Result Value Ref Range    WBC 11.7 (H) 4.3 - 11.1 K/uL    RBC 4.81 4.05 - 5.2 M/uL    Hemoglobin 14.0 11.7 - 15.4 g/dL    Hematocrit 43.3 35.8 - 46.3 %    MCV 90.0 82 - 102 FL    MCH 29.1 26.1 - 32.9 PG    MCHC 32.3 31.4 - 35.0 g/dL    RDW 11.7 (L) 11.9 - 14.6 %    Platelets 088 049 - 652 K/uL    MPV 11.9 9.4 - 12.3 FL    nRBC 0.00 0.0 - 0.2 K/uL    Differential Type AUTOMATED      Seg Neutrophils 71 43 - 78 %    Lymphocytes 21 13 - 44 %    Monocytes 6 4.0 - 12.0 %    Eosinophils % 1 0.5 - 7.8 %    Basophils 1 0.0 - 2.0 %    Immature Granulocytes 0 0.0 - 5.0 %    Segs Absolute 8.3 (H) 1.7 - 8.2 K/UL    Absolute Lymph # 2.4 0.5 - 4.6 K/UL    Absolute Mono # 0.7 0.1 - 1.3 K/UL    Absolute Eos # 0.2 0.0 - 0.8 K/UL    Basophils Absolute 0.1 0.0 - 0.2 K/UL    Absolute Immature Granulocyte 0.0 0.0 - 0.5 K/UL   Comprehensive Metabolic Panel    Collection Time: 01/09/23  7:19 PM   Result Value Ref Range    Sodium 139 133 - 143 mmol/L    Potassium 3.6 3.5 - 5.1 mmol/L    Chloride 106 101 - 110 mmol/L    CO2 29 21 - 32 mmol/L    Anion Gap 4 2 - 11 mmol/L    Glucose 105 (H) 65 - 100 mg/dL    BUN 14 6 - 23 MG/DL    Creatinine 1.00 0.6 - 1.0 MG/DL    Est, Glom Filt Rate >60 >60 ml/min/1.73m2    Calcium 8.7 8.3 - 10.4 MG/DL    Total Bilirubin 0.3 0.2 - 1.1 MG/DL    ALT 43 12 - 65 U/L    AST 25 15 - 37 U/L    Alk Phosphatase 83 50 - 136 U/L    Total Protein 7.9 6.3 - 8.2 g/dL    Albumin 4.1 3.5 - 5.0 g/dL    Globulin 3.8 2.8 - 4.5 g/dL    Albumin/Globulin Ratio 1.1 0.4 - 1.6     Magnesium    Collection Time: 01/09/23  7:19 PM   Result Value Ref Range    Magnesium 2.3 1.8 - 2.4 mg/dL   Phosphorus    Collection Time: 01/09/23  7:19 PM   Result Value Ref Range    Phosphorus 3.2 2.5 - 4.5 MG/DL   Troponin    Collection Time: 01/09/23  7:19 PM   Result Value Ref Range    Troponin, High Sensitivity 875.0 (HH) 0 - 14 pg/mL   POCT Urinalysis no Micro    Collection Time: 01/09/23  7:33 PM   Result Value Ref Range    Specific Gravity, Urine, POC >1.030 (H) 1.001 - 1.023    pH, Urine, POC 6.0 5.0 - 9.0      Protein, Urine, POC Negative NEG mg/dL    Glucose, UA POC Negative NEG mg/dL    Ketones, Urine, POC Negative NEG mg/dL    Bilirubin, Urine, POC Negative NEG      Blood, UA POC MODERATE (A) NEG      URINE UROBILINOGEN POC 0.2 0.2 - 1.0 EU/dL    Nitrate, Urine, POC Negative NEG      Leukocyte Est, UA POC Negative NEG      Performed by: Pretty Davis    Troponin    Collection Time: 01/09/23  9:24 PM   Result Value Ref Range    Troponin, High Sensitivity 814.0 (HH) 0 - 14 pg/mL       I have personally reviewed imaging studies showing: Other Studies:  XR CHEST (2 VW)   Final Result   No consolidation.          CTA CHEST W WO CONTRAST    (Results Pending)       Current Meds:  Current Facility-Administered Medications   Medication Dose Route Frequency    sodium chloride flush 0.9 % injection 5-40 mL  5-40 mL IntraVENous 2 times per day    sodium chloride flush 0.9 % injection 5-40 mL  5-40 mL IntraVENous PRN    0.9 % sodium chloride infusion   IntraVENous PRN    enoxaparin (LOVENOX) injection 40 mg  40 mg SubCUTAneous Daily    ondansetron (ZOFRAN-ODT) disintegrating tablet 4 mg  4 mg Oral Q8H PRN    Or    ondansetron (ZOFRAN) injection 4 mg  4 mg IntraVENous Q6H PRN    polyethylene glycol (GLYCOLAX) packet 17 g  17 g Oral Daily PRN    acetaminophen (TYLENOL) tablet 650 mg  650 mg Oral Q6H PRN    Or    acetaminophen (TYLENOL) suppository 650 mg  650 mg Rectal Q6H PRN    carvedilol (COREG) tablet 6.25 mg  6.25 mg Oral BID WC    hydrALAZINE (APRESOLINE) injection 10 mg  10 mg IntraVENous Q6H PRN    cloNIDine (CATAPRES) tablet 0.1 mg  0.1 mg Oral Q4H PRN    niCARdipine (CARDENE) 25 mg in sodium chloride 0.9 % 250 mL infusion ([id number])  2.5-15 mg/hr IntraVENous Continuous       Signed:  Nyla Buckley MD    Part of this note may have been written by using a voice dictation software. The note has been proof read but may still contain some grammatical/other typographical errors.

## 2023-01-10 NOTE — H&P
Hospitalist History and Physical   Admit Date:  2023  8:03 PM   Name:  Polo Rain   Age:  59 y.o. Sex:  female  :  1965   MRN:  [id number]   [facility]    Presenting Complaint: Hypertension     Reason(s) for Admission: No admission diagnoses are documented for this encounter. History of Present Illness:   Polo Rain is a 59 y.o. female with medical history of HLD who presented with high BP 3-4 day duration associated with \"feeling weird\" sensation described as pain in her back and neck that radiates to her L arm of 1 day duration. Her BP at home have been 200's/100's. She has been taking OTC natural medications but have not helped. She denies hx of HTN and does not take any meds at home. She called her cardiologist and was told to come to the ED. She sees cardiology d/t family history of cardiac disease. In ED, /98. WBC 11.7. Trop on admission 875>>814. EKG on admission not c/w ACS. CXR shows no acute abnormalities. Cardene gtt was initiated in ED and cardiology was consulted. Assessment & Plan:     Hypertensive emergency  Hx of HTN  Initial /98 with evidence of end-organ damage with back and chest pain with elevated Troponin. Start Cardene gtt  Add Coreg BID  Avoid lowering BP too quickly  Goal to lower MAP by 10-20% in first hour then gradually by further 5-15% over next 23h. Target BP of <180/120 for first hour and <160/110 for next 23  Check CTA chest to r/o aortic dissection    Demand ischemia  Elevated Troponin  HS-Trop on admission 875>>814. EKG on admission not c/w ACS. Most likely 2/2 Type 2 demand ischemia due to hypertensive emergency.    Monitor pt on telemetry  Trend HS-trop  Check TTE  Consult Cardiology, appreciate recs    Leukocytosis  WBC 11.7 most likely reactive/stress response d/t hypertensive emergency  CTM CBC in AM        Anticipated discharge needs:   >2 midnights      Diet: No diet orders on file  VTE ppx: Lovenox SQ  Code status: No Order    Patient is critically ill. Without intervention, there is a high probability of acute organ impairment or life-threatening deterioration. Critical care interventions: see plan of care  Total critical care time spent: 36 minutes. Time is indicative of direct patient attendance at bedside and on the patient's floor nearby. Includes time spent at bedside performing history and exam, performing chart review, discussing findings and treatment plan with patient and/or family, discussing patient with nursing staff, consultants and colleagues, and ordering/reviewing pertinent laboratory and radiographic evaluations. Time excludes procedures. CPT:  74379: First 30-74 minutes  16314: Each block of 30 min. beyond 76    Hospital Problems:  Principal Problem:    Hypertensive emergency  Active Problems:    Elevation of cardiac enzymes    Hyperlipidemia    Essential hypertension  Resolved Problems:    * No resolved hospital problems.  *       Past History:     Past Medical History:   Diagnosis Date    Dizziness 2017    Ear problems     Hearing reduced     Hyperlipidemia 2015    Other ill-defined conditions(799.89)     Syncope with sight of blood       Past Surgical History:   Procedure Laterality Date    HYSTERECTOMY (CERVIX STATUS UNKNOWN)          Social History     Tobacco Use    Smoking status: Former     Types: Cigarettes     Quit date: 1991     Years since quittin.5    Smokeless tobacco: Never   Substance Use Topics    Alcohol use: No      Social History     Substance and Sexual Activity   Drug Use No       Family History   Problem Relation Age of Onset    Elevated Lipids Mother     Hypertension Mother     Coronary Art Dis Father         premature     Coronary Art Dis Mother         Immunization History   Administered Date(s) Administered    COVID-19, PFIZER PURPLE top, DILUTE for use, (age 15 y+), 30mcg/0.3mL 03/15/2021, 2021    Tdap (Boostrix, Adacel) 06/07/2018     Allergies   Allergen Reactions    Ciprofloxacin Rash and Shortness Of Breath    Nitrofurantoin Shortness Of Breath    Sulfa Antibiotics Other (See Comments)    Cimetidine Rash    Clindamycin Hcl Rash     Prior to Admit Medications:  Current Outpatient Medications   Medication Instructions    ascorbic acid (VITAMIN C) 500 MG tablet Oral    cetirizine (ZYRTEC) 10 MG tablet Take by mouth    clorazepate (TRANXENE) 7.5 mg, 2 TIMES DAILY    estradiol (VIVELLE) 0.1 MG/24HR 1 patch, TransDERmal, TWICE WEEKLY    rosuvastatin (CRESTOR) 10 mg         Objective:   Patient Vitals for the past 24 hrs:   Temp Pulse Resp BP SpO2   01/09/23 2323 -- 77 13 (!) 166/84 97 %   01/09/23 2308 -- 77 11 (!) 163/82 97 %   01/09/23 2253 -- 78 10 (!) 140/78 96 %   01/09/23 2238 -- 74 10 (!) 167/81 95 %   01/09/23 2226 -- 78 14 (!) 172/86 93 %   01/09/23 2216 -- 68 12 (!) 167/84 96 %   01/09/23 2156 -- 79 13 (!) 197/101 96 %   01/09/23 2146 -- 68 17 (!) 196/91 96 %   01/09/23 2145 -- 70 18 (!) 229/92 95 %   01/09/23 2136 -- 58 12 (!) 229/92 99 %   01/09/23 2126 -- 60 12 (!) 245/98 98 %   01/09/23 2047 98.4 °F (36.9 °C) 60 20 (!) 218/83 98 %   01/09/23 1758 98.8 °F (37.1 °C) 68 -- (!) 231/93 97 %       Oxygen Therapy  SpO2: 97 %  Pulse Oximeter Device Mode: Continuous  O2 Device: None (Room air)    Estimated body mass index is 33.84 kg/m² as calculated from the following:    Height as of this encounter: 5' 2\" (1.575 m). Weight as of this encounter: 185 lb (83.9 kg). No intake or output data in the 24 hours ending 01/09/23 2353      Physical Exam:    Blood pressure (!) 166/84, pulse 77, temperature 98.4 °F (36.9 °C), resp. rate 13, height 5' 2\" (1.575 m), weight 185 lb (83.9 kg), SpO2 97 %. General:    Well nourished. Head:  Normocephalic, atraumatic  Eyes:  Sclerae appear normal.  Pupils equally round. ENT:  Nares appear normal.  Moist oral mucosa  Neck:  No restricted ROM. Trachea midline   CV:   RRR. No m/r/g.   No jugular venous distension. Lungs:   CTAB. No wheezing, rhonchi, or rales. Symmetric expansion. Abdomen:   Soft, nontender, nondistended. Extremities: No cyanosis or clubbing. No edema  Skin:     No rashes and normal coloration. Warm and dry. Neuro:  CN II-XII grossly intact. Sensation intact. Psych:  Normal mood and affect.       I have personally reviewed labs and tests showing:  Recent Labs:  Recent Results (from the past 24 hour(s))   EKG 12 Lead    Collection Time: 01/09/23  7:09 PM   Result Value Ref Range    Ventricular Rate 67 BPM    Atrial Rate 67 BPM    P-R Interval 164 ms    QRS Duration 92 ms    Q-T Interval 412 ms    QTc Calculation (Bazett) 435 ms    P Axis 61 degrees    R Axis 25 degrees    T Axis 46 degrees    Diagnosis Normal sinus rhythm    CBC with Auto Differential    Collection Time: 01/09/23  7:19 PM   Result Value Ref Range    WBC 11.7 (H) 4.3 - 11.1 K/uL    RBC 4.81 4.05 - 5.2 M/uL    Hemoglobin 14.0 11.7 - 15.4 g/dL    Hematocrit 43.3 35.8 - 46.3 %    MCV 90.0 82 - 102 FL    MCH 29.1 26.1 - 32.9 PG    MCHC 32.3 31.4 - 35.0 g/dL    RDW 11.7 (L) 11.9 - 14.6 %    Platelets 713 163 - 443 K/uL    MPV 11.9 9.4 - 12.3 FL    nRBC 0.00 0.0 - 0.2 K/uL    Differential Type AUTOMATED      Seg Neutrophils 71 43 - 78 %    Lymphocytes 21 13 - 44 %    Monocytes 6 4.0 - 12.0 %    Eosinophils % 1 0.5 - 7.8 %    Basophils 1 0.0 - 2.0 %    Immature Granulocytes 0 0.0 - 5.0 %    Segs Absolute 8.3 (H) 1.7 - 8.2 K/UL    Absolute Lymph # 2.4 0.5 - 4.6 K/UL    Absolute Mono # 0.7 0.1 - 1.3 K/UL    Absolute Eos # 0.2 0.0 - 0.8 K/UL    Basophils Absolute 0.1 0.0 - 0.2 K/UL    Absolute Immature Granulocyte 0.0 0.0 - 0.5 K/UL   Comprehensive Metabolic Panel    Collection Time: 01/09/23  7:19 PM   Result Value Ref Range    Sodium 139 133 - 143 mmol/L    Potassium 3.6 3.5 - 5.1 mmol/L    Chloride 106 101 - 110 mmol/L    CO2 29 21 - 32 mmol/L    Anion Gap 4 2 - 11 mmol/L    Glucose 105 (H) 65 - 100 mg/dL    BUN 14 6 - 23 MG/DL    Creatinine 1.00 0.6 - 1.0 MG/DL    Est, Glom Filt Rate >60 >60 ml/min/1.73m2    Calcium 8.7 8.3 - 10.4 MG/DL    Total Bilirubin 0.3 0.2 - 1.1 MG/DL    ALT 43 12 - 65 U/L    AST 25 15 - 37 U/L    Alk Phosphatase 83 50 - 136 U/L    Total Protein 7.9 6.3 - 8.2 g/dL    Albumin 4.1 3.5 - 5.0 g/dL    Globulin 3.8 2.8 - 4.5 g/dL    Albumin/Globulin Ratio 1.1 0.4 - 1.6     Magnesium    Collection Time: 01/09/23  7:19 PM   Result Value Ref Range    Magnesium 2.3 1.8 - 2.4 mg/dL   Phosphorus    Collection Time: 01/09/23  7:19 PM   Result Value Ref Range    Phosphorus 3.2 2.5 - 4.5 MG/DL   Troponin    Collection Time: 01/09/23  7:19 PM   Result Value Ref Range    Troponin, High Sensitivity 875.0 (HH) 0 - 14 pg/mL   POCT Urinalysis no Micro    Collection Time: 01/09/23  7:33 PM   Result Value Ref Range    Specific Gravity, Urine, POC >1.030 (H) 1.001 - 1.023    pH, Urine, POC 6.0 5.0 - 9.0      Protein, Urine, POC Negative NEG mg/dL    Glucose, UA POC Negative NEG mg/dL    Ketones, Urine, POC Negative NEG mg/dL    Bilirubin, Urine, POC Negative NEG      Blood, UA POC MODERATE (A) NEG      URINE UROBILINOGEN POC 0.2 0.2 - 1.0 EU/dL    Nitrate, Urine, POC Negative NEG      Leukocyte Est, UA POC Negative NEG      Performed by: Felice Davis    Troponin    Collection Time: 01/09/23  9:24 PM   Result Value Ref Range    Troponin, High Sensitivity 814.0 (HH) 0 - 14 pg/mL       I have personally reviewed imaging studies showing:  XR CHEST (2 VW)    Result Date: 1/9/2023  PA LATERAL CHEST  1/9/2023 7:07 PM HISTORY: Chest pain  ; hypertension COMPARISON: None FINDINGS:  The heart size is within normal limits. There is no lobar consolidation, pleural effusions or pulmonary edema. No consolidation. Echocardiogram:  No results found for this or any previous visit.         Orders Placed This Encounter   Medications    niCARdipine (CARDENE) 25 mg in sodium chloride 0.9 % 250 mL infusion ([id number])     Order Specific Question:   Titrate Infusion?     Answer:   Yes     Order Specific Question:   Initial Infusion Rate:     Answer:   5 mg/hr     Order Specific Question:   Goal of Therapy is:     Answer:   SBP less than 160 mmHg     Order Specific Question:   Contact Provider if:     Answer:   Patient is receiving the maximum dose and is not achieving the goal of therapy    acetaminophen (TYLENOL) tablet 1,000 mg    LORazepam (ATIVAN) injection 1 mg         Signed:  Jeri Mcgrath DO    Part of this note may have been written by using a voice dictation software.  The note has been proof read but may still contain some grammatical/other typographical errors.

## 2023-01-10 NOTE — CARE COORDINATION
Case Management Assessment  Initial Evaluation    Date/Time of Evaluation: 1/10/2023 3:29 PM  Assessment Completed by: Roxy Bills RN    If patient is discharged prior to next notation, then this note serves as note for discharge by case management. Patient Name: Archie Watkins                   YOB: 1965  Diagnosis: Elevation of cardiac enzymes [R74.8]  Elevated troponin level [R77.8]  Hypertensive emergency without congestive heart failure [I16.1]  Hypertensive emergency [I16.1]                   Date / Time: 1/9/2023  8:03 PM    Patient Admission Status: Inpatient   Readmission Risk (Low < 19, Mod (19-27), High > 27): Readmission Risk Score: 4.8    Current PCP: Dusty De Oliveira MD  PCP verified by CM? Chart Reviewed: Yes      History Provided by: Medical Record  Patient Orientation: Alert and Oriented    Patient Cognition: Alert    Hospitalization in the last 30 days (Readmission):  No    If yes, Readmission Assessment in CM Navigator will be completed. Advance Directives:      Code Status: Full Code   Patient's Primary Decision Maker is:        Discharge Planning:    Patient lives with:   Type of Home: (P) House  Primary Care Giver: Self  Patient Support Systems include: Parent, Family Members, Friends/Neighbors   Current Financial resources: Other (Comment) (BCBS)  Current community resources:  (none)  Current services prior to admission: None            Current DME:              Type of Home Care services:  None    ADLS  Prior functional level: Independent in ADLs/IADLs  Current functional level: Other (see comment)    PT AM-PAC:   /24  OT AM-PAC:   /24    Family can provide assistance at DC: Yes  Would you like Case Management to discuss the discharge plan with any other family members/significant others, and if so, who?  Yes  Plans to Return to Present Housing: Yes  Other Identified Issues/Barriers to RETURNING to current housing: pending d/c needs  Potential Assistance needed at discharge: (P)  (pending)            Potential DME:  pending  Patient expects to discharge to: (P) [facility] for transportation at discharge: (P) Family    Financial    Payor: Aisha Kinney / Plan: Della Garner / Product Type: *No Product type* /     Does insurance require precert for SNF: Yes    Potential assistance Purchasing Medications: (P) No  Meds-to-Beds request:        Catrachita Vásquez [address], [address] [place] [address] Road [phone] Dillon Trinh [phone] [address] [address]  Phone: [phone] Fax: [phone]      Notes:    Factors facilitating achievement of predicted outcomes: Cooperative, Pleasant, and Good insight into deficits    Barriers to discharge: pending medical treatment    Additional Case Management Notes: Chart reviewed s/p admission to ICU for elevated trop/hypertensive emergency and cardene gtt. LHC today. Pt has PCP and insurance.  No needs identified at present for d/c. CM to follow for any d/c needs/POC per MD. V-Y Flap Text: The defect edges were debeveled with a #15 scalpel blade.  Given the location of the defect, shape of the defect and the proximity to free margins a V-Y flap was deemed most appropriate.  Using a sterile surgical marker, an appropriate advancement flap was drawn incorporating the defect and placing the expected incisions within the relaxed skin tension lines where possible.    The area thus outlined was incised deep to adipose tissue with a #15 scalpel blade.  The skin margins were undermined to an appropriate distance in all directions utilizing iris scissors.

## 2023-01-10 NOTE — PROGRESS NOTES
TRANSFER - OUT REPORT:    R radial [facility] with Dr Norma Giron  Versed 5 mg  Fentanyl 125 mcg  Angiomax off at 1400  Brilinta 180 mg  Stent to the distal RCA  TR band 12 ml  No bleeding or hematoma noted at site. Site soft    Verbal report given to Lala Cohen on Roula Greco  being transferred to ICU for routine progression of patient care       Report consisted of patient's Situation, Background, Assessment and   Recommendations(SBAR). Information from the following report(s) MAR and Event Log was reviewed with the receiving nurse. Lines:   Peripheral IV 01/09/23 Left Antecubital (Active)   Site Assessment Clean, dry & intact 01/10/23 1115   Line Status Infusing 01/10/23 1115   Line Care Connections checked and tightened 01/10/23 1115   Phlebitis Assessment No symptoms 01/10/23 1115   Infiltration Assessment 0 01/10/23 1115   Alcohol Cap Used No 01/10/23 1115   Dressing Status Clean, dry & intact 01/10/23 1115   Dressing Type Transparent 01/10/23 1115       Peripheral IV 01/10/23 Right Antecubital (Active)   Site Assessment Clean, dry & intact 01/10/23 1115   Line Status Brisk blood return;Flushed;Normal saline locked 01/10/23 1115   Line Care Connections checked and tightened 01/10/23 1115   Phlebitis Assessment No symptoms 01/10/23 1115   Infiltration Assessment 0 01/10/23 1115   Alcohol Cap Used Yes 01/10/23 1115   Dressing Status Clean, dry & intact 01/10/23 1115   Dressing Type Transparent 01/10/23 1115        Opportunity for questions and clarification was provided.       Patient transported with:  Registered Nurse

## 2023-01-10 NOTE — CONSULTS
In this split/shared evaluation I performed reviewed the patients's H&P, available images, labs, cultures. , discussed case in detail with ACP, performed a medically appropriate history and exam, counseled and educated the patient and/or family member, ordered and/or reviewed medications, tests or procedures, documented information in EMR, independently interpreted images and coordinated care. Personal Time: 45 minutes -this equates to greater than 50% of total time in patient consultation/care. Patient seen and examined by me. Agree with above note by physician extender. Key findings are: 68-year-old female well-known to me. She has a strong family history of coronary artery disease. Recent coronary calcium score is 1. She struggles with medication intolerances and wishes to remain primarily natural.  Over the last 4 to 5 days patient describes worsening hypertension. She presents to the emergency room with hypertensive emergency. EKG demonstrates nausea Gaffey T wave changes and elevated high-sensitivity troponin. Review of echocardiogram in office demonstrates normal left ventricular systolic function. Echocardiogram at bedside demonstrates normal left ventricular systolic function no significant valvular heart disease. Patient is lying flat in room denies chest pain orthopnea PND syncope visual changes.     Vitals:    01/10/23 0915 01/10/23 0930 01/10/23 0945 01/10/23 1000   BP: (!) 150/77 (!) 163/87 (!) 152/87 (!) 161/78   Pulse: 69 67 66 66   Resp: 13 30 12 16   Temp:       TempSrc:       SpO2: 95% 98% 95% 93%   Weight:       Height:            General: Patient is alert and oriented, no apparent distress  HEENT: Pupils equal reactive, oropharynx clear  Chest: Clear to auscultation bilaterally  Cardiovascular: S1-S2 regular with no murmur  Abdomen: Soft positive bowel sounds  Extremities: Soft no edema with intact distal pulses    Principal Problem:    Hypertensive emergency  Plan: Patient admitted with hypertensive emergency. She was initially controlled on Cardene. We will transition to oral therapies today utilizing carvedilol 6.25 mg twice daily, losartan 50 mg twice daily, amlodipine 5 mg twice daily. This should allow Cardene to be discontinued. Patient does have elevated cardiac enzymes with normal left ventricular systolic function. Given extensive cardiovascular family history we will proceed with cardiac catheterization. Active Problems:    Elevation of cardiac enzymes  Plan: We will proceed with cardiac catheterization. Risk benefits of procedure discussed the patient is he willing to proceed. Leukocytosis  Plan: Patient denies any overt signs of infection. Consideration could be given to COVID testing given the presentation with hypertension and elevated high-sensitivity troponin. Demand ischemia ([address]  Plan: See above. Hyperlipidemia  Plan: Patient has historically refused therapies for dyslipidemia. Pending findings of cardiac catheterization will determine whether we address lipid therapies prior to discharge. Essential hypertension  Plan: See above          Renetta Jean MD      [facility] Initial Cardiac Evaluation                Date of  Admission: 1/9/2023  8:03 PM     Primary Care Physician: Shannan Osgood  Primary Cardiologist: Dr. Robyn Dodd (last seen in 2017)  Referring Physician: Dr. Inna Garcia Physician: Dr. Robyn Dodd    HPI: elevated troponin      Marvelyn Gaucher is a 59 y.o. female with past medical history of HLD and HTN who presented to the ER due to elevated BP at home for the past 4 days. Patient denies chest pain or shortness of breath. Reports \"weird feeling\" in her neck and head. Reports that her diet has been poor recently with increased sodium intake and states that she has gained some weight. Denies ever taking blood pressure medications in the past. Last seen by [facility] in 2017.  Called her PCP office today and spoke with her PCPs MA and was instructed to go the ER for evaluation due to unavailable appointment today. She called our office and was told the same. Upon arrival to the ER, patient was noted to be significantly hypertensive with BP of 231/93. EKG shows SR without acute ST/T wave changes. Cardiac enzymes were done despite no complaint of chest pain and were found to be elevated at 875 and 814. Patient denies anginal or heart failure symptoms at this time. WE have been asked to see patient in consultation regarding elevated cardiac enzymes. Past Medical History:   Diagnosis Date    Dizziness 7/28/2017    Ear problems     Hearing reduced     Hyperlipidemia 12/9/2015    Other ill-defined conditions(799.89)     Syncope with sight of blood      Past Surgical History:   Procedure Laterality Date    HYSTERECTOMY (CERVIX STATUS UNKNOWN)       Allergies   Allergen Reactions    Ciprofloxacin Rash and Shortness Of Breath    Nitrofurantoin Shortness Of Breath    Sulfa Antibiotics Other (See Comments)    Cimetidine Rash    Clindamycin Hcl Rash      Family History   Problem Relation Age of Onset    Elevated Lipids Mother     Hypertension Mother     Coronary Art Dis Father         premature     Coronary Art Dis Mother         Current Facility-Administered Medications   Medication Dose Route Frequency    niCARdipine (CARDENE) 25 mg in sodium chloride 0.9 % 250 mL infusion ([id number])  2.5-15 mg/hr IntraVENous Continuous    LORazepam (ATIVAN) injection 1 mg  1 mg IntraVENous Once     Current Outpatient Medications   Medication Sig    cetirizine (ZYRTEC) 10 MG tablet Take by mouth (Patient not taking: Reported on 6/28/2022)    estradiol (VIVELLE) 0.1 MG/24HR Place 1 patch onto the skin Twice a Week    ascorbic acid (VITAMIN C) 500 MG tablet Take by mouth    clorazepate (TRANXENE) 7.5 MG tablet Take 7.5 mg by mouth 2 times daily.  (Patient not taking: Reported on 6/28/2022)    rosuvastatin (CRESTOR) 10 MG tablet Take 10 mg by mouth (Patient not taking: Reported on 6/28/2022)       Review of Systems    Review of Systems   Constitutional: Negative. HENT: Negative. Eyes: Negative. Cardiovascular:  Negative for chest pain. Respiratory: Negative. Endocrine: Negative. Hematologic/Lymphatic: Negative. Skin: Negative. Musculoskeletal:  Positive for neck pain. Gastrointestinal: Negative. Genitourinary: Negative. Neurological:  Positive for headaches. Psychiatric/Behavioral: Negative. Allergic/Immunologic: Negative. Subjective:   BP (!) 229/92   Pulse 70   Temp 98.4 °F (36.9 °C)   Resp 18   Ht 5' 2\" (1.575 m)   Wt 185 lb (83.9 kg)   SpO2 95%   BMI 33.84 kg/m²   Physical Exam  HENT:      Mouth/Throat:      Mouth: Mucous membranes are moist.   Eyes:      Pupils: Pupils are equal, round, and reactive to light. Cardiovascular:      Rate and Rhythm: Normal rate and regular rhythm. Heart sounds: Normal heart sounds. Pulmonary:      Breath sounds: Normal breath sounds. Abdominal:      General: Bowel sounds are normal.   Musculoskeletal:         General: No swelling. Skin:     General: Skin is warm and dry. Neurological:      Mental Status: She is alert and oriented to person, place, and time. Psychiatric:         Mood and Affect: Mood normal.        Cardiographics    Telemetry: normal sinus rhythm  ECG: normal sinus rhythm  Echocardiogram:  ordered    Labs:   Recent Labs     01/09/23  1919      K 3.6   MG 2.3   BUN 14   WBC 11.7*   HGB 14.0   HCT 43.3           Assessment/Plan:     Assessment:       Hypertensive emergency -- started on IV Cardene in the ER by ER MD. DREW to admit. Elevation of cardiac enzymes -- supply demand mismatch in the setting of hypertensive emergency. Denies chest pain. Previous calcium score of 1. Check echocardiogram in the AM      Hyperlipidemia -- remain poorly controlled by labs done ~ 6 months ago.  Has been hesitant in the past to start statin therapy. Check lipid panel in the AM      Essential hypertension -- has history listed in [address] [facility] [address], but denies ever taking anti-hypertensives in the past. Documented SBP over the past year at PCP was 120-136. Thank you very much for this referral. We appreciate the opportunity to participate in this patient's care. We will follow along with above stated plan.     FELIX Mccullough - CNP  Consulting MD: Sherry Hobbs

## 2023-01-10 NOTE — ED NOTES
TRANSFER - OUT REPORT:    Verbal report given to [facility] on Manasa Ohms  being transferred to [facility] for routine progression of patient care       Report consisted of patient's Situation, Background, Assessment and   Recommendations(SBAR). Information from the following report(s) Nurse Handoff Report was reviewed with the receiving nurse. [place] Assessment: Presents to emergency department  because of falls (Syncope, seizure, or loss of consciousness): No, Age > 79: No, Altered Mental Status, Intoxication with alcohol or substance confusion (Disorientation, impaired judgment, poor safety awaremess, or inability to follow instructions): No, Impaired Mobility: Ambulates or transfers with assistive devices or assistance; Unable to ambulate or transer.: No, Nursing Judgement: No  Lines:   Peripheral IV 01/09/23 Left Antecubital (Active)   Site Assessment Clean, dry & intact 01/09/23 1921   Line Status Blood return noted;Brisk blood return 01/09/23 1921   Phlebitis Assessment No symptoms 01/09/23 1921   Infiltration Assessment 0 01/09/23 1921   Alcohol Cap Used No 01/09/23 1921   Dressing Status New dressing applied 01/09/23 1921        Opportunity for questions and clarification was provided.       Patient transported with:  Registered Nurse          Deana Parker RN  01/10/23 0218

## 2023-01-10 NOTE — ED NOTES
Pt reports she will need to be [facility]" prior to CT in the AM. Pt states she is claustrophobic.       Hamlet Robles RN  01/10/23 7816

## 2023-01-11 VITALS
SYSTOLIC BLOOD PRESSURE: 177 MMHG | WEIGHT: 176.8 LBS | HEIGHT: 62 IN | TEMPERATURE: 98.6 F | DIASTOLIC BLOOD PRESSURE: 77 MMHG | BODY MASS INDEX: 32.54 KG/M2 | RESPIRATION RATE: 23 BRPM | OXYGEN SATURATION: 99 % | HEART RATE: 57 BPM

## 2023-01-11 LAB
ANION GAP SERPL CALC-SCNC: 7 MMOL/L (ref 2–11)
BASOPHILS # BLD: 0.1 K/UL (ref 0–0.2)
BASOPHILS NFR BLD: 1 % (ref 0–2)
BUN SERPL-MCNC: 15 MG/DL (ref 6–23)
CALCIUM SERPL-MCNC: 8.8 MG/DL (ref 8.3–10.4)
CHLORIDE SERPL-SCNC: 105 MMOL/L (ref 101–110)
CO2 SERPL-SCNC: 26 MMOL/L (ref 21–32)
CREAT SERPL-MCNC: 0.9 MG/DL (ref 0.6–1)
DIFFERENTIAL METHOD BLD: NORMAL
EKG ATRIAL RATE: 56 BPM
EKG ATRIAL RATE: 69 BPM
EKG DIAGNOSIS: NORMAL
EKG DIAGNOSIS: NORMAL
EKG P AXIS: 44 DEGREES
EKG P AXIS: 52 DEGREES
EKG P-R INTERVAL: 180 MS
EKG P-R INTERVAL: 184 MS
EKG Q-T INTERVAL: 450 MS
EKG Q-T INTERVAL: 480 MS
EKG QRS DURATION: 104 MS
EKG QRS DURATION: 98 MS
EKG QTC CALCULATION (BAZETT): 463 MS
EKG QTC CALCULATION (BAZETT): 482 MS
EKG R AXIS: -23 DEGREES
EKG R AXIS: 3 DEGREES
EKG T AXIS: 52 DEGREES
EKG T AXIS: 56 DEGREES
EKG VENTRICULAR RATE: 56 BPM
EKG VENTRICULAR RATE: 69 BPM
EOSINOPHIL # BLD: 0.2 K/UL (ref 0–0.8)
EOSINOPHIL NFR BLD: 2 % (ref 0.5–7.8)
ERYTHROCYTE [DISTWIDTH] IN BLOOD BY AUTOMATED COUNT: 11.9 % (ref 11.9–14.6)
GLUCOSE SERPL-MCNC: 108 MG/DL (ref 65–100)
HCT VFR BLD AUTO: 41.6 % (ref 35.8–46.3)
HGB BLD-MCNC: 13.5 G/DL (ref 11.7–15.4)
IMM GRANULOCYTES # BLD AUTO: 0.1 K/UL (ref 0–0.5)
IMM GRANULOCYTES NFR BLD AUTO: 1 % (ref 0–5)
LYMPHOCYTES # BLD: 1.5 K/UL (ref 0.5–4.6)
LYMPHOCYTES NFR BLD: 15 % (ref 13–44)
MAGNESIUM SERPL-MCNC: 2.7 MG/DL (ref 1.8–2.4)
MCH RBC QN AUTO: 29.2 PG (ref 26.1–32.9)
MCHC RBC AUTO-ENTMCNC: 32.5 G/DL (ref 31.4–35)
MCV RBC AUTO: 89.8 FL (ref 82–102)
MONOCYTES # BLD: 0.7 K/UL (ref 0.1–1.3)
MONOCYTES NFR BLD: 7 % (ref 4–12)
NEUTS SEG # BLD: 7.6 K/UL (ref 1.7–8.2)
NEUTS SEG NFR BLD: 74 % (ref 43–78)
NRBC # BLD: 0 K/UL (ref 0–0.2)
PLATELET # BLD AUTO: 308 K/UL (ref 150–450)
PMV BLD AUTO: 11 FL (ref 9.4–12.3)
POTASSIUM SERPL-SCNC: 3.4 MMOL/L (ref 3.5–5.1)
RBC # BLD AUTO: 4.63 M/UL (ref 4.05–5.2)
SODIUM SERPL-SCNC: 138 MMOL/L (ref 133–143)
WBC # BLD AUTO: 10 K/UL (ref 4.3–11.1)

## 2023-01-11 PROCEDURE — 6370000000 HC RX 637 (ALT 250 FOR IP): Performed by: INTERNAL MEDICINE

## 2023-01-11 PROCEDURE — 80048 BASIC METABOLIC PNL TOTAL CA: CPT

## 2023-01-11 PROCEDURE — 6360000002 HC RX W HCPCS: Performed by: EMERGENCY MEDICINE

## 2023-01-11 PROCEDURE — 6370000000 HC RX 637 (ALT 250 FOR IP): Performed by: HOSPITALIST

## 2023-01-11 PROCEDURE — 2580000003 HC RX 258: Performed by: INTERNAL MEDICINE

## 2023-01-11 PROCEDURE — 85025 COMPLETE CBC W/AUTO DIFF WBC: CPT

## 2023-01-11 PROCEDURE — 93005 ELECTROCARDIOGRAM TRACING: CPT | Performed by: EMERGENCY MEDICINE

## 2023-01-11 PROCEDURE — 83735 ASSAY OF MAGNESIUM: CPT

## 2023-01-11 RX ORDER — ASPIRIN 81 MG/1
81 TABLET, CHEWABLE ORAL DAILY
Qty: 30 TABLET | Refills: 0 | Status: SHIPPED | OUTPATIENT
Start: 2023-01-12

## 2023-01-11 RX ORDER — AMLODIPINE BESYLATE 5 MG/1
5 TABLET ORAL EVERY 12 HOURS
Qty: 60 TABLET | Refills: 0 | Status: SHIPPED | OUTPATIENT
Start: 2023-01-11 | End: 2023-01-13 | Stop reason: SDUPTHER

## 2023-01-11 RX ORDER — ATROPINE SULFATE 0.1 MG/ML
1 INJECTION INTRAVENOUS ONCE
Status: COMPLETED | OUTPATIENT
Start: 2023-01-11 | End: 2023-01-11

## 2023-01-11 RX ORDER — POTASSIUM CHLORIDE 7.45 MG/ML
10 INJECTION INTRAVENOUS PRN
Status: DISCONTINUED | OUTPATIENT
Start: 2023-01-11 | End: 2023-01-11 | Stop reason: HOSPADM

## 2023-01-11 RX ORDER — ROSUVASTATIN CALCIUM 20 MG/1
20 TABLET, COATED ORAL NIGHTLY
Qty: 30 TABLET | Refills: 0 | Status: SHIPPED | OUTPATIENT
Start: 2023-01-11 | End: 2023-01-13 | Stop reason: SDUPTHER

## 2023-01-11 RX ORDER — LOSARTAN POTASSIUM 50 MG/1
50 TABLET ORAL EVERY 12 HOURS
Qty: 60 TABLET | Refills: 0 | Status: SHIPPED | OUTPATIENT
Start: 2023-01-11 | End: 2023-01-13 | Stop reason: SDUPTHER

## 2023-01-11 RX ORDER — MAGNESIUM SULFATE IN WATER 40 MG/ML
2000 INJECTION, SOLUTION INTRAVENOUS PRN
Status: DISCONTINUED | OUTPATIENT
Start: 2023-01-11 | End: 2023-01-11 | Stop reason: HOSPADM

## 2023-01-11 RX ORDER — LOSARTAN POTASSIUM 50 MG/1
50 TABLET ORAL EVERY 12 HOURS
Qty: 30 TABLET | Refills: 0 | Status: SHIPPED | OUTPATIENT
Start: 2023-01-11 | End: 2023-01-11 | Stop reason: SDUPTHER

## 2023-01-11 RX ORDER — POTASSIUM CHLORIDE 20 MEQ/1
40 TABLET, EXTENDED RELEASE ORAL PRN
Status: DISCONTINUED | OUTPATIENT
Start: 2023-01-11 | End: 2023-01-11 | Stop reason: HOSPADM

## 2023-01-11 RX ADMIN — TICAGRELOR 90 MG: 90 TABLET ORAL at 04:10

## 2023-01-11 RX ADMIN — ATROPINE SULFATE 1 MG: 0.1 INJECTION, SOLUTION INTRAVENOUS at 01:56

## 2023-01-11 RX ADMIN — AMLODIPINE BESYLATE 5 MG: 5 TABLET ORAL at 08:30

## 2023-01-11 RX ADMIN — LOSARTAN POTASSIUM 50 MG: 50 TABLET, FILM COATED ORAL at 08:29

## 2023-01-11 RX ADMIN — SODIUM CHLORIDE, PRESERVATIVE FREE 5 ML: 5 INJECTION INTRAVENOUS at 08:31

## 2023-01-11 RX ADMIN — ASPIRIN 81 MG: 81 TABLET, CHEWABLE ORAL at 08:30

## 2023-01-11 RX ADMIN — POTASSIUM CHLORIDE 40 MEQ: 1500 TABLET, EXTENDED RELEASE ORAL at 05:13

## 2023-01-11 ASSESSMENT — PAIN SCALES - GENERAL
PAINLEVEL_OUTOF10: 0
PAINLEVEL_OUTOF10: 0

## 2023-01-11 NOTE — INTERDISCIPLINARY ROUNDS
Multi-D Rounds/Checklist (leapfrog):  Lines: can any be removed?: None       DVT Prophylaxis: Ordered  Vent: N/A  Nutrition Ordered/appropriate: Ordered  Can antibiotics or other drugs be stopped? Is Nozin performed: N/A  Consults needed: None  A: Is pain control adequate? (has PRNs? Stop drip?) Yes  B: Sedation break and SBT? N/A  C: Is sedation choice appropriate? N/A  D: Delirium/CAM-ICU? No  E: Mobility goals/appropriateness? Yes  F: Family update and plan? Father is primary contact and is being updated daily by primary attending and nursing staff.     Sangeetha Domínguez APRN - NP

## 2023-01-11 NOTE — PLAN OF CARE
Problem: Discharge Planning  Goal: Discharge to home or other facility with appropriate resources  Outcome: Progressing  Flowsheets (Taken 1/11/2023 0720)  Discharge to home or other facility with appropriate resources:   Identify barriers to discharge with patient and caregiver   Arrange for needed discharge resources and transportation as appropriate   Identify discharge learning needs (meds, wound care, etc)     Problem: Chronic Conditions and Co-morbidities  Goal: Patient's chronic conditions and co-morbidity symptoms are monitored and maintained or improved  Outcome: Progressing  Flowsheets (Taken 1/11/2023 0720)  Care Plan - Patient's Chronic Conditions and Co-Morbidity Symptoms are Monitored and Maintained or Improved:   Monitor and assess patient's chronic conditions and comorbid symptoms for stability, deterioration, or improvement   Collaborate with multidisciplinary team to address chronic and comorbid conditions and prevent exacerbation or deterioration   Update acute care plan with appropriate goals if chronic or comorbid symptoms are exacerbated and prevent overall improvement and discharge     Problem: Safety - Adult  Goal: Free from fall injury  Outcome: Progressing     Problem: Pain  Goal: Verbalizes/displays adequate comfort level or baseline comfort level  Outcome: Progressing

## 2023-01-11 NOTE — PROGRESS NOTES
[facility] CARDIOLOGY PROGRESS NOTE           1/11/2023 10:15 AM    Admit Date: 1/9/2023      Subjective:   Patient is resting comfortably in room. Blood pressure remains slightly labile but was hypotensive last night with bradycardia. Suspect this may have been vagal mediated versus obstructive sleep apnea. This resolved without intervention. ROS:  Cardiovascular:  As noted above    Objective:      Vitals:    01/11/23 0700 01/11/23 0900 01/11/23 0935 01/11/23 1000   BP: (!) 154/67 (!) 170/73 (!) 174/72 (!) 161/70   Pulse: 53 62 57 54   Resp: 10 16 13 16   Temp:       TempSrc:       SpO2: 96% 97% 97% 97%   Weight:       Height:           Physical Exam:  General-No Acute Distress  Neck- supple, no JVD  CV- regular rate and rhythm no MRG  Lung- clear bilaterally  Abd- soft, nontender, nondistended  Ext- no edema bilaterally. Skin- warm and dry    Data Review:   Recent Labs     01/09/23  1919 01/10/23  0651 01/11/23  0330    141 138   K 3.6 3.7 3.4*   MG 2.3  --  2.7*   BUN 14 13 15   WBC 11.7*  --  10.0   HGB 14.0  --  13.5   HCT 43.3  --  41.6     --  308   CHOL  --  248*  --    HDL  --  34*  --        Assessment/Plan:     Principal Problem:    Hypertensive emergency  Plan: Patient admitted with severe hypertensive urgency/emergency. Medications are appropriate at this time. Can address additional changes at time of return to office. Patient already has follow-up appointment with me within the next 2 to 4 weeks. 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 Patient is stable on aspirin and Brilinta. Leukocytosis  Plan: Stress mediated    Demand ischemia ([address]  Plan: See above    Hyperlipidemia  Plan: Rosuvastatin 20 mg daily    Essential hypertension  Plan: See above    Patient stable to discharge home from cardiovascular standpoint.   Please call with questions. Recommend patient obtain outpatient sleep study to assess for underlying sleep apnea.         Everett Salmno MD  1/11/2023 10:15 AM

## 2023-01-11 NOTE — CARE COORDINATION
Most likely d/c home this day. No needs voiced per MD.        01/11/23 1038   Discharge Planning   Type of Residence House   Current Services Prior To Admission None   Potential Assistance Needed N/A   DME Ordered? No   Patient expects to be discharged to: [facility]   Transition of Care Consult (CM Consult) N/A   Services At/After Discharge None   Confirm Follow Up Transport Family   Condition of Participation: Discharge Planning   Freedom of Choice list was provided with basic dialogue that supports the patient's individualized plan of care/goals, treatment preferences, and shares the quality data associated with the providers?   Yes

## 2023-01-11 NOTE — MANAGEMENT PLAN
Patient had an episode of profound bradycardia and hypotension overnight. ? Reperfusion of RCA. BB stopped for AM administration. Now hemodynamically stable.     Gloria Mcdaniel, APRN - CNP  5:30 AM

## 2023-01-11 NOTE — PROGRESS NOTES
A follow up visit was made to the patient. Emotional support, spiritual presence and   prayer were provided for the patient. She is thankful for a procedure that she had yesterday. She shared that she may be discharged today.       Mp [place], [address] [facility], [facility]

## 2023-01-11 NOTE — PROGRESS NOTES
Hospitalist Progress Note   Admit Date:  2023  8:03 PM   Name:  Savana Arguelles   Age:  59 y.o. Sex:  female  :  1965   MRN:  [id number]   Room:  [facility]/    Presenting Complaint: Hypertension     Reason(s) for Admission: Elevation of cardiac enzymes [R74.8]  Elevated troponin level [R77.8]  Hypertensive emergency without congestive heart failure [I16.1]  Hypertensive emergency [I16.1]     Hospital Course:     Savana Arguelles is a 59 y.o. female with medical history of  HLP   Admitted with hypertensive emergency and chest pain. Admitted to ICU for IV cardene drip. Coreg started. Added cozaar/ norvasc. Troponin elevated. Cardiology following. ECHO preserved EF. S/p LHC on 1-10-23, stenting RCA. Brilinta added. CXR negative. CTA chest/AP negative excluding small granuloma. UA has hematuria- recommend urology followup. Discharge plans pending to home. Subjective & 24hr Events (23):      hypotensive and bradycardic overnight, stopped coreg, improved post atropine x 1 dose, eating breakfast, has chronic balance issues and passes out even with laying down, eating breakfast,        Assessment & Plan:     Principal Problem:    Hypertensive emergency  Plan:     Essential hypertension  Plan:   Weaned off IV cardene  Stopped coreg due to hypotension/bradycardia  Continued  norvasc, cozaar   Defer to cardiology        Hypotension  Bradycardia:  Resolved  Defer meds to cardiology          Active Problems:    Elevation of cardiac enzymes  Plan:   Demand ischemia ([address]  Plan:   CAD:    Hyperlipidemia  Plan:   Cardiology following  Asa, crestor, brilinta            Leukocytosis  Plan:   Trend lab-resolved  CXR, UA negative          Hematuria: Will need followup outpatient    Hypokalemia:  Replace and repeat lab        Anticipated discharge needs:      Pending to home    Diet:  ADULT DIET;  Regular; Low Fat/Low Chol/High Fiber/2 gm Na  DVT PPx: lovenox  Code status: Full Code      Hospital Problems:  Principal Problem:    Hypertensive emergency  Active Problems:    Elevation of cardiac enzymes    Leukocytosis    Demand ischemia (HCC)    Hyperlipidemia    Essential hypertension  Resolved Problems:    * No resolved hospital problems. *      Objective:   Patient Vitals for the past 24 hrs:   Temp Pulse Resp BP SpO2   01/11/23 0530 -- 57 12 138/63 96 %   01/11/23 0515 -- 68 (!) 31 (!) 144/67 95 %   01/11/23 0500 -- 63 13 (!) 124/55 94 %   01/11/23 0445 -- 53 14 133/61 95 %   01/11/23 0430 -- 55 12 (!) 143/65 95 %   01/11/23 0415 -- 58 (!) 8 (!) 143/65 96 %   01/11/23 0400 -- 57 10 136/63 96 %   01/11/23 0345 -- 74 30 (!) 125/59 98 %   01/11/23 0330 -- 58 (!) 9 135/64 97 %   01/11/23 0315 -- 60 12 (!) 140/66 95 %   01/11/23 0300 98.7 °F (37.1 °C) 59 14 135/63 94 %   01/11/23 0245 -- 63 (!) 7 139/66 95 %   01/11/23 0230 -- 62 (!) 8 (!) 140/65 95 %   01/11/23 0215 -- 64 10 (!) 123/57 96 %   01/11/23 0201 -- 69 15 119/60 96 %   01/11/23 0200 -- 68 27 (!) 117/58 95 %   01/11/23 0159 -- 71 14 (!) 92/51 93 %   01/11/23 0158 -- 65 16 (!) 75/38 95 %   01/11/23 0155 -- (!) 34 10 (!) 75/40 97 %   01/11/23 0100 -- 53 12 (!) 141/67 92 %   01/11/23 0000 -- 52 23 (!) 146/67 91 %   01/10/23 2300 98.3 °F (36.8 °C) (!) 49 17 (!) 144/68 93 %   01/10/23 2200 -- 50 24 (!) 149/66 92 %   01/10/23 2100 -- 53 24 (!) 148/69 93 %   01/10/23 2000 -- 58 26 (!) 157/69 95 %   01/10/23 1900 98.2 °F (36.8 °C) 58 11 (!) 164/82 95 %   01/10/23 1800 -- 83 19 -- --   01/10/23 1700 -- 69 15 (!) 158/97 96 %   01/10/23 1600 -- 53 12 (!) 141/73 95 %   01/10/23 1530 -- 53 12 128/71 94 %   01/10/23 1505 98.1 °F (36.7 °C) 52 12 134/71 94 %   01/10/23 1245 -- 60 13 (!) 182/83 (!) 89 %   01/10/23 1130 -- 68 11 130/79 95 %   01/10/23 1115 98.2 °F (36.8 °C) 62 15 (!) 143/72 95 %   01/10/23 1015 -- 70 22 -- 97 %   01/10/23 1000 -- 66 16 (!) 161/78 93 %   01/10/23 0945 -- 66 12 (!) 152/87 95 %   01/10/23 0930 -- 67 30 (!) 163/87 98 %  01/10/23 0915 -- 69 13 (!) 150/77 95 %   01/10/23 0900 -- 71 14 (!) 162/77 96 %   01/10/23 0840 -- 65 12 (!) 178/87 97 %   01/10/23 0835 -- 70 15 (!) 182/92 97 %   01/10/23 0800 -- 69 15 (!) 163/85 96 %   01/10/23 0745 -- 63 23 (!) 166/82 95 %   01/10/23 0730 -- 67 11 (!) 179/87 97 %   01/10/23 0715 98.3 °F (36.8 °C) 69 14 (!) 168/84 97 %   01/10/23 0700 -- 71 13 (!) 167/84 98 %       Oxygen Therapy  SpO2: 96 %  Pulse Oximetry Type: Continuous  Pulse via Oximetry: 57 beats per minute  Pulse Oximeter Device Mode: Continuous  Pulse Oximeter Device Location: Left, Finger  O2 Device: None (Room air)  Oximetry Probe Site Changed: Yes  Skin Assessment: Clean, dry, & intact  Skin Protection for O2 Device: N/A    Estimated body mass index is 32.34 kg/m² as calculated from the following:    Height as of this encounter: 5' 2\" (1.575 m). Weight as of this encounter: 176 lb 12.8 oz (80.2 kg). Intake/Output Summary (Last 24 hours) at 1/11/2023 0638  Last data filed at 1/11/2023 0544  Gross per 24 hour   Intake 980 ml   Output 205 ml   Net 775 ml         Physical Exam:     Blood pressure 138/63, pulse 57, temperature 98.7 °F (37.1 °C), temperature source Oral, resp. rate 12, height 5' 2\" (1.575 m), weight 176 lb 12.8 oz (80.2 kg), SpO2 96 %. General:    Well nourished. Alert, no distress , pleasant   CV:   RRR. No m/r/g. No jugular venous distension. No edema   Lungs:   CTAB. No wheezing, rhonchi, or rales. Symmetric expansion. Extremities: No cyanosis or clubbing. No edema  Skin:     No rashes and normal coloration. Warm and dry. Neuro:  grossly intact. Psych:  Normal mood and affect.       I have personally reviewed labs and tests showing:  Recent Labs:  Recent Results (from the past 48 hour(s))   EKG 12 Lead    Collection Time: 01/09/23  7:09 PM   Result Value Ref Range    Ventricular Rate 67 BPM    Atrial Rate 67 BPM    P-R Interval 164 ms    QRS Duration 92 ms    Q-T Interval 412 ms    QTc Calculation (Aric) 435 ms    P Axis 61 degrees    R Axis 25 degrees    T Axis 46 degrees    Diagnosis Normal sinus rhythm    CBC with Auto Differential    Collection Time: 01/09/23  7:19 PM   Result Value Ref Range    WBC 11.7 (H) 4.3 - 11.1 K/uL    RBC 4.81 4.05 - 5.2 M/uL    Hemoglobin 14.0 11.7 - 15.4 g/dL    Hematocrit 43.3 35.8 - 46.3 %    MCV 90.0 82 - 102 FL    MCH 29.1 26.1 - 32.9 PG    MCHC 32.3 31.4 - 35.0 g/dL    RDW 11.7 (L) 11.9 - 14.6 %    Platelets 063 361 - 761 K/uL    MPV 11.9 9.4 - 12.3 FL    nRBC 0.00 0.0 - 0.2 K/uL    Differential Type AUTOMATED      Seg Neutrophils 71 43 - 78 %    Lymphocytes 21 13 - 44 %    Monocytes 6 4.0 - 12.0 %    Eosinophils % 1 0.5 - 7.8 %    Basophils 1 0.0 - 2.0 %    Immature Granulocytes 0 0.0 - 5.0 %    Segs Absolute 8.3 (H) 1.7 - 8.2 K/UL    Absolute Lymph # 2.4 0.5 - 4.6 K/UL    Absolute Mono # 0.7 0.1 - 1.3 K/UL    Absolute Eos # 0.2 0.0 - 0.8 K/UL    Basophils Absolute 0.1 0.0 - 0.2 K/UL    Absolute Immature Granulocyte 0.0 0.0 - 0.5 K/UL   Comprehensive Metabolic Panel    Collection Time: 01/09/23  7:19 PM   Result Value Ref Range    Sodium 139 133 - 143 mmol/L    Potassium 3.6 3.5 - 5.1 mmol/L    Chloride 106 101 - 110 mmol/L    CO2 29 21 - 32 mmol/L    Anion Gap 4 2 - 11 mmol/L    Glucose 105 (H) 65 - 100 mg/dL    BUN 14 6 - 23 MG/DL    Creatinine 1.00 0.6 - 1.0 MG/DL    Est, Glom Filt Rate >60 >60 ml/min/1.73m2    Calcium 8.7 8.3 - 10.4 MG/DL    Total Bilirubin 0.3 0.2 - 1.1 MG/DL    ALT 43 12 - 65 U/L    AST 25 15 - 37 U/L    Alk Phosphatase 83 50 - 136 U/L    Total Protein 7.9 6.3 - 8.2 g/dL    Albumin 4.1 3.5 - 5.0 g/dL    Globulin 3.8 2.8 - 4.5 g/dL    Albumin/Globulin Ratio 1.1 0.4 - 1.6     Magnesium    Collection Time: 01/09/23  7:19 PM   Result Value Ref Range    Magnesium 2.3 1.8 - 2.4 mg/dL   Phosphorus    Collection Time: 01/09/23  7:19 PM   Result Value Ref Range    Phosphorus 3.2 2.5 - 4.5 MG/DL   Troponin    Collection Time: 01/09/23  7:19 PM   Result Value Ref Range    Troponin, High Sensitivity 875.0 (HH) 0 - 14 pg/mL   POCT Urinalysis no Micro    Collection Time: 01/09/23  7:33 PM   Result Value Ref Range    Specific Gravity, Urine, POC >1.030 (H) 1.001 - 1.023    pH, Urine, POC 6.0 5.0 - 9.0      Protein, Urine, POC Negative NEG mg/dL    Glucose, UA POC Negative NEG mg/dL    Ketones, Urine, POC Negative NEG mg/dL    Bilirubin, Urine, POC Negative NEG      Blood, UA POC MODERATE (A) NEG      URINE UROBILINOGEN POC 0.2 0.2 - 1.0 EU/dL    Nitrate, Urine, POC Negative NEG      Leukocyte Est, UA POC Negative NEG      Performed by: Lord Nettie Davis    Troponin    Collection Time: 01/09/23  9:24 PM   Result Value Ref Range    Troponin, High Sensitivity 814.0 (HH) 0 - 14 pg/mL   Basic Metabolic Panel w/ Reflex to MG    Collection Time: 01/10/23  6:51 AM   Result Value Ref Range    Sodium 141 133 - 143 mmol/L    Potassium 3.7 3.5 - 5.1 mmol/L    Chloride 105 101 - 110 mmol/L    CO2 29 21 - 32 mmol/L    Anion Gap 7 2 - 11 mmol/L    Glucose 89 65 - 100 mg/dL    BUN 13 6 - 23 MG/DL    Creatinine 0.80 0.6 - 1.0 MG/DL    Est, Glom Filt Rate >60 >60 ml/min/1.73m2    Calcium 9.2 8.3 - 10.4 MG/DL   Lipid Panel    Collection Time: 01/10/23  6:51 AM   Result Value Ref Range    Cholesterol, Total 248 (H) <200 MG/DL    Triglycerides 249 (H) 35 - 150 MG/DL    HDL 34 (L) 40 - 60 MG/DL    LDL Calculated 164.2 (H) <100 MG/DL    VLDL Cholesterol Calculated 49.8 (H) 6.0 - 23.0 MG/DL    Chol/HDL Ratio 7.3     Transthoracic echocardiogram (TTE) complete with contrast, bubble, strain, and 3D PRN    Collection Time: 01/10/23  9:08 AM   Result Value Ref Range    LV EDV A2C 93 mL    LV EDV A4C 109 mL    LV ESV A2C 38 mL    LV ESV A4C 44 mL    IVSd 1.1 (A) 0.6 - 0.9 cm    LVIDd 4.9 3.9 - 5.3 cm    LVIDs 3.3 cm    LVOT Diameter 2.0 cm    LVOT Mean Gradient 3 mmHg    LVOT VTI 26.8 cm    LVOT Peak Velocity 1.3 m/s    LVOT Peak Gradient 6 mmHg    LVPWd 1.0 (A) 0.6 - 0.9 cm    LV E' Lateral Velocity 8 cm/s    LV E' Septal Velocity 8 cm/s    LV Ejection Fraction A2C 59 %    LV Ejection Fraction A4C 60 %    EF BP 59 55 - 100 %    LVOT Area 3.1 cm2    LVOT SV 84.2 ml    LA Minor Axis 4.6 cm    LA Major [place] 5.4 cm    LA Area 2C 16.3 cm2    LA Area 4C 17.9 cm2    LA Volume 2C 47 22 - 52 mL    LA Volume 4C 48 22 - 52 mL    LA Volume BP 50 22 - 52 mL    LA Diameter 3.3 cm    AV Mean Velocity 1.3 m/s    AV Mean Gradient 7 mmHg    AV VTI 35.6 cm    AV Peak Velocity 1.7 m/s    AV Peak Gradient 11 mmHg    AV Area by VTI 2.4 cm2    AV Area by Peak Velocity 2.4 cm2    Aortic Root 3.1 cm    Ascending Aorta 2.8 cm    IVC Proxmal 1.3 cm    MV E Wave Deceleration Time 232.0 ms    MV A Velocity 1.21 m/s    MV E Velocity 1.00 m/s    PV .0 ms    PV Max Velocity 1.2 m/s    PV Peak Gradient 6 mmHg    RVIDd 3.0 cm    RV Basal Dimension 3.2 cm    RV Free Wall Peak S' 15 cm/s    TAPSE 2.2 1.7 cm    Body Surface Area 1.87 m2    Fractional Shortening 2D 33 28 - 44 %    LV ESV Index A4C 24 mL/m2    LV EDV Index A4C 60 mL/m2    LV ESV Index A2C 21 mL/m2    LV EDV Index A2C 51 mL/m2    LVIDd Index 2.69 cm/m2    LVIDs Index 1.81 cm/m2    LV RWT Ratio 0.41     LV Mass 2D 188.1 (A) 67 - 162 g    LV Mass 2D Index 103.3 (A) 43 - 95 g/m2    MV E/A 0.83     E/E' Ratio (Averaged) 12.50     E/E' Lateral 12.50     E/E' Septal 12.50     LA Volume Index BP 27 16 - 34 ml/m2    LVOT Stroke Volume Index 46.2 mL/m2    LA Volume Index 2C 26 16 - 34 mL/m2    LA Volume Index 4C 26 16 - 34 mL/m2    LA Size Index 1.81 cm/m2    LA/AO Root Ratio 1.06     Ao Root Index 1.70 cm/m2    Ascending Aorta Index 1.54 cm/m2    AV Velocity Ratio 0.76     LVOT:AV VTI Index 0.75     TRISH/BSA VTI 1.3 cm2/m2    TRISH/BSA Peak Velocity 1.3 cm2/m2    Est. RA Pressure 3 mmHg   Cardiac procedure    Collection Time: 01/10/23  2:03 PM   Result Value Ref Range    Body Surface Area 1.87 m2   EKG 12 lead    Collection Time: 01/10/23  4:07 PM   Result Value Ref Range    Ventricular Rate 56 BPM    Atrial Rate 56 BPM    P-R Interval 184 ms    QRS Duration 98 ms    Q-T Interval 480 ms    QTc Calculation (Bazett) 463 ms    P Axis 44 degrees    R Axis -23 degrees    T Axis 56 degrees    Diagnosis Sinus bradycardia    EKG 12 Lead    Collection Time: 01/11/23  2:00 AM   Result Value Ref Range    Ventricular Rate 69 BPM    Atrial Rate 69 BPM    P-R Interval 180 ms    QRS Duration 104 ms    Q-T Interval 450 ms    QTc Calculation (Bazett) 482 ms    P Axis 52 degrees    R Axis 3 degrees    T Axis 52 degrees    Diagnosis       !!! Poor data quality, interpretation may be adversely affected   Basic Metabolic Panel w/ Reflex to MG    Collection Time: 01/11/23  3:30 AM   Result Value Ref Range    Sodium 138 133 - 143 mmol/L    Potassium 3.4 (L) 3.5 - 5.1 mmol/L    Chloride 105 101 - 110 mmol/L    CO2 26 21 - 32 mmol/L    Anion Gap 7 2 - 11 mmol/L    Glucose 108 (H) 65 - 100 mg/dL    BUN 15 6 - 23 MG/DL    Creatinine 0.90 0.6 - 1.0 MG/DL    Est, Glom Filt Rate >60 >60 ml/min/1.73m2    Calcium 8.8 8.3 - 10.4 MG/DL   CBC with Auto Differential    Collection Time: 01/11/23  3:30 AM   Result Value Ref Range    WBC 10.0 4.3 - 11.1 K/uL    RBC 4.63 4.05 - 5.2 M/uL    Hemoglobin 13.5 11.7 - 15.4 g/dL    Hematocrit 41.6 35.8 - 46.3 %    MCV 89.8 82 - 102 FL    MCH 29.2 26.1 - 32.9 PG    MCHC 32.5 31.4 - 35.0 g/dL    RDW 11.9 11.9 - 14.6 %    Platelets 794 331 - 047 K/uL    MPV 11.0 9.4 - 12.3 FL    nRBC 0.00 0.0 - 0.2 K/uL    Differential Type AUTOMATED      Seg Neutrophils 74 43 - 78 %    Lymphocytes 15 13 - 44 %    Monocytes 7 4.0 - 12.0 %    Eosinophils % 2 0.5 - 7.8 %    Basophils 1 0.0 - 2.0 %    Immature Granulocytes 1 0.0 - 5.0 %    Segs Absolute 7.6 1.7 - 8.2 K/UL    Absolute Lymph # 1.5 0.5 - 4.6 K/UL    Absolute Mono # 0.7 0.1 - 1.3 K/UL    Absolute Eos # 0.2 0.0 - 0.8 K/UL    Basophils Absolute 0.1 0.0 - 0.2 K/UL    Absolute Immature Granulocyte 0.1 0.0 - 0.5 K/UL   Magnesium    Collection Time: 01/11/23 3:30 AM   Result Value Ref Range    Magnesium 2.7 (H) 1.8 - 2.4 mg/dL       I have personally reviewed imaging studies showing: Other Studies:  CTA CHEST ABDOMEN PELVIS W WO CONTRAST   Final Result      No evidence of acute aortic disease. XR CHEST (2 VW)   Final Result   No consolidation.              Current Meds:  Current Facility-Administered Medications   Medication Dose Route Frequency    magnesium sulfate 2000 mg in 50 mL IVPB premix  2,000 mg IntraVENous PRN    sodium phosphate 10 mmol in sodium chloride 0.9 % 250 mL IVPB  10 mmol IntraVENous PRN    Or    sodium phosphate 15 mmol in sodium chloride 0.9 % 250 mL IVPB  15 mmol IntraVENous PRN    Or    sodium phosphate 20 mmol in sodium chloride 0.9 % 500 mL IVPB  20 mmol IntraVENous PRN    potassium chloride (KLOR-CON M) extended release tablet 40 mEq  40 mEq Oral PRN    Or    potassium bicarb-citric acid (EFFER-K) effervescent tablet 40 mEq  40 mEq Oral PRN    Or    potassium chloride 10 mEq/100 mL IVPB (Peripheral Line)  10 mEq IntraVENous PRN    sodium chloride flush 0.9 % injection 5-40 mL  5-40 mL IntraVENous 2 times per day    sodium chloride flush 0.9 % injection 5-40 mL  5-40 mL IntraVENous PRN    0.9 % sodium chloride infusion   IntraVENous PRN    enoxaparin (LOVENOX) injection 40 mg  40 mg SubCUTAneous Daily    ondansetron (ZOFRAN-ODT) disintegrating tablet 4 mg  4 mg Oral Q8H PRN    Or    ondansetron (ZOFRAN) injection 4 mg  4 mg IntraVENous Q6H PRN    polyethylene glycol (GLYCOLAX) packet 17 g  17 g Oral Daily PRN    acetaminophen (TYLENOL) suppository 650 mg  650 mg Rectal Q6H PRN    [Held by provider] carvedilol (COREG) tablet 6.25 mg  6.25 mg Oral BID WC    cloNIDine (CATAPRES) tablet 0.1 mg  0.1 mg Oral Q4H PRN    losartan (COZAAR) tablet 50 mg  50 mg Oral Q12H    amLODIPine (NORVASC) tablet 5 mg  5 mg Oral Q12H    hydrALAZINE (APRESOLINE) injection 20 mg  20 mg IntraVENous Q6H PRN    aspirin chewable tablet 81 mg  81 mg Oral Daily LORazepam (ATIVAN) injection 0.5 mg  0.5 mg IntraVENous Q6H PRN    sodium chloride flush 0.9 % injection 5-40 mL  5-40 mL IntraVENous 2 times per day    sodium chloride flush 0.9 % injection 5-40 mL  5-40 mL IntraVENous PRN    0.9 % sodium chloride infusion   IntraVENous PRN    acetaminophen (TYLENOL) tablet 650 mg  650 mg Oral Q4H PRN    HYDROcodone-acetaminophen (NORCO) 5-325 MG per tablet 1 tablet  1 tablet Oral Q4H PRN    Or    HYDROcodone-acetaminophen (NORCO) 5-325 MG per tablet 2 tablet  2 tablet Oral Q4H PRN    morphine (PF) injection 2 mg  2 mg IntraVENous Q2H PRN    Or    morphine injection 4 mg  4 mg IntraVENous Q2H PRN    ondansetron (ZOFRAN) injection 4 mg  4 mg IntraVENous Q6H PRN    ticagrelor (BRILINTA) tablet 90 mg  90 mg Oral BID    rosuvastatin (CRESTOR) tablet 20 mg  20 mg Oral Nightly    niCARdipine (CARDENE) 25 mg in sodium chloride 0.9 % 250 mL infusion ([id number])  2.5-15 mg/hr IntraVENous Continuous       Signed:  Logan Burger MD    Part of this note may have been written by using a voice dictation software. The note has been proof read but may still contain some grammatical/other typographical errors.

## 2023-01-11 NOTE — PROGRESS NOTES
0149: pts HR garima down to 22, went into room. Pt felt dizzy, hypotensive, HR 30s  Gave atropine x1, HR 60s and now normatensive. Attempted to obtain EKG but patient HR up by the time machine was obtain. Pt is feeling much better.

## 2023-01-11 NOTE — PROGRESS NOTES
Pt taken down in wheelchair. Father driving her home. All iv's removed with catheter intact. No complaints of pain before leaving.

## 2023-01-12 ENCOUNTER — CARE COORDINATION (OUTPATIENT)
Dept: CARE COORDINATION | Facility: CLINIC | Age: 58
End: 2023-01-12

## 2023-01-12 ENCOUNTER — TELEPHONE (OUTPATIENT)
Dept: CARDIOLOGY CLINIC | Age: 58
End: 2023-01-12

## 2023-01-12 DIAGNOSIS — Z98.61 CAD S/P PERCUTANEOUS CORONARY ANGIOPLASTY: Primary | ICD-10-CM

## 2023-01-12 DIAGNOSIS — I25.10 CAD S/P PERCUTANEOUS CORONARY ANGIOPLASTY: Primary | ICD-10-CM

## 2023-01-12 NOTE — CARE COORDINATION
Care Transitions Outreach Attempt    Call within 2 business days of discharge: Yes   Attempted to reach patient for transitions of care follow up. Unable to reach patient. Patient: Joel Patel Patient : 1965 MRN: [id number]    Last Discharge [address]       Date Complaint Diagnosis Description Type Department Provider    23 Hypertension Hypertensive emergency without congestive heart failure . .. ED to Hosp-Admission (Discharged) (ADMITTED) [id number] Froilan Bundy MD; Dustin Mckeon. .. Was this an external facility discharge? No Discharge Facility: [facility]    Noted following upcoming appointments from discharge chart review:   [facility] follow up appointment(s):   Future Appointments   Date Time Provider Krystina Yang   2023  9:00 AM Merlyn Segura MD [facility] AM[facility]   2023 10:00 AM Sebastián Matos MD [facility]     Non-[facility] follow up appointment(s): na    Hospital Course:        Joel Patel is a 59 y.o. female with medical history of  HLP   Admitted with hypertensive emergency and chest pain. Admitted to ICU for IV cardene drip. Coreg started. Added cozaar/ norvasc. Troponin elevated. Cardiology following. ECHO preserved EF. S/p LHC on 1-10-23, stenting RCA. Brilinta added. CXR negative. CTA chest/AP negative excluding small granuloma. UA has hematuria- recommend urology followup. Discharge plans  to home. Disposition: home      Diet: ADULT DIET; Regular; Low Fat/Low Chol/High Fiber/2 gm Na  Code Status: Full Code    LIZZETH outreach initial call. Left message, identified self, reason for call, return call contact information, Tri Rubin  [facility]. Reminded of need to schedule follow up appointments with PCP and cardiology.

## 2023-01-12 NOTE — TELEPHONE ENCOUNTER
Cardiac rehab called and asked for an outpatient referral to them.  States the pt was discharged from the hospital and missed getting a referral.

## 2023-01-13 ENCOUNTER — CARE COORDINATION (OUTPATIENT)
Dept: CARE COORDINATION | Facility: CLINIC | Age: 58
End: 2023-01-13

## 2023-01-13 DIAGNOSIS — I16.1 HYPERTENSIVE EMERGENCY: Primary | ICD-10-CM

## 2023-01-13 DIAGNOSIS — I10 ESSENTIAL HYPERTENSION: ICD-10-CM

## 2023-01-13 DIAGNOSIS — E78.5 HYPERLIPIDEMIA: ICD-10-CM

## 2023-01-13 RX ORDER — AMLODIPINE BESYLATE 5 MG/1
5 TABLET ORAL EVERY 12 HOURS
Qty: 60 TABLET | Refills: 0 | Status: SHIPPED | OUTPATIENT
Start: 2023-01-13

## 2023-01-13 RX ORDER — ROSUVASTATIN CALCIUM 20 MG/1
20 TABLET, COATED ORAL NIGHTLY
Qty: 30 TABLET | Refills: 0 | Status: SHIPPED | OUTPATIENT
Start: 2023-01-13

## 2023-01-13 RX ORDER — LOSARTAN POTASSIUM 50 MG/1
50 TABLET ORAL EVERY 12 HOURS
Qty: 60 TABLET | Refills: 0 | Status: SHIPPED | OUTPATIENT
Start: 2023-01-13

## 2023-01-13 NOTE — CARE COORDINATION
[facility] Care Transitions Initial Follow Up Call    Call within 2 business days of discharge: Yes    LPN Care Coordinator contacted the patient by telephone to perform post hospital discharge assessment. Verified name and  with patient as identifiers. Provided introduction to self, and explanation of the LPN Care Coordinator role.     Patient: Valeria Langston Patient : 1965   MRN: [id number]  Reason for Admission: chest pain, hypertension  Discharge Date: 23 RARS: Readmission Risk Score: 3.8      Last Discharge [facility] Facility       Date Complaint Diagnosis Description Type Department Provider    23 Hypertension Hypertensive emergency without congestive heart failure ... ED to Hosp-Admission (Discharged) (ADMITTED) [id number] Leslee Hussein MD; Ronal Magana..            Was this an external facility discharge? No Discharge Facility: [facility]    Challenges to be reviewed by the provider   Additional needs identified to be addressed with provider: No  none               Method of communication with provider: none.        LPN Care Coordinator reviewed discharge instructions, medical action plan, and red flags with patient who verbalized understanding. The patient was given an opportunity to ask questions and does not have any further questions or concerns at this time. Were discharge instructions available to patient? Yes. Reviewed appropriate site of care based on symptoms and resources available to patient including: PCP  Specialist  Urgent care clinics  When to call 911  [facility]. The patient agrees to contact the PCP office for questions related to their healthcare.     Advance Care Planning:   Does patient have an Advance Directive: not on file and patient states that she has a living will.  .    Medication reconciliation was performed with patient, who verbalizes understanding of administration of home medications. Medications reviewed, 1111F entered: N/A    Was patient discharged with a  pulse oximeter? no    Non-face-to-face services provided:  Obtained and reviewed discharge summary and/or continuity of care documents  Communication with home health agencies or other community services the patient is currently using-[facility]  Education of patient/family/caregiver/guardian to support self-management-Pamella Brunson LPN [phone]  All scheduled appointments    Offered patient enrollment in the Remote Patient Monitoring (RPM) program for in-home monitoring: NA.    Care Transitions 24 Hour Call    Do you have a copy of your discharge instructions?: Yes  Do you have all of your prescriptions and are they filled?: Yes  Have you been contacted by a [address]?: No  Have you scheduled your follow up appointment?: Yes  How are you going to get to your appointment?: Car - drive self  Do you have support at home?: Alone  Do you feel like you have everything you need to keep you well at home?: Yes  Are you an active caregiver in your home?: No  Care Transitions Interventions         Follow Up  Future Appointments   Date Time Provider Krystina Yang   1/16/2023  2:00 PM [facility]   2/1/2023  9:00 AM MD GERMAN Farah AMB   [facility]2023 10[facility]       LPN Care Coordinator provided contact information. Plan for follow-up call in 5-7 days based on severity of symptoms and risk factors. Plan for next call: self management-diet hydration, exercise, follow up appointments.      Patti Gaitan LPN

## 2023-01-13 NOTE — TELEPHONE ENCOUNTER
Requested Prescriptions     Pending Prescriptions Disp Refills    losartan (COZAAR) 50 MG tablet 60 tablet 0     Sig: Take 1 tablet by mouth in the morning and 1 tablet in the evening. rosuvastatin (CRESTOR) 20 MG tablet 30 tablet 0     Sig: Take 1 tablet by mouth nightly    amLODIPine (NORVASC) 5 MG tablet 60 tablet 0     Sig: Take 1 tablet by mouth in the morning and 1 tablet in the evening.     ticagrelor (BRILINTA) 90 MG TABS tablet 60 tablet 0     Sig: Take 1 tablet by mouth 2 times daily

## 2023-01-13 NOTE — TELEPHONE ENCOUNTER
Patient states that she will run out of her Cozaar, Crestor, Norvasc, and Brilinta before she sees Dr. Rock Luis in February. The hospital only gave her 30 day prescriptions. Her pharmacy is the [facility] at [address] in [facility].

## 2023-01-16 ENCOUNTER — HOSPITAL ENCOUNTER (OUTPATIENT)
Dept: CARDIAC REHAB | Age: 58
Setting detail: RECURRING SERIES
Discharge: HOME | End: 2023-01-19

## 2023-01-16 ASSESSMENT — PATIENT HEALTH QUESTIONNAIRE - PHQ9
SUM OF ALL RESPONSES TO PHQ QUESTIONS 1-9: 5
5. POOR APPETITE OR OVEREATING: 0
SUM OF ALL RESPONSES TO PHQ9 QUESTIONS 1 & 2: 2
7. TROUBLE CONCENTRATING ON THINGS, SUCH AS READING THE NEWSPAPER OR WATCHING TELEVISION: 0
2. FEELING DOWN, DEPRESSED OR HOPELESS: 1
SUM OF ALL RESPONSES TO PHQ QUESTIONS 1-9: 5
4. FEELING TIRED OR HAVING LITTLE ENERGY: 1
1. LITTLE INTEREST OR PLEASURE IN DOING THINGS: 1
9. THOUGHTS THAT YOU WOULD BE BETTER OFF DEAD, OR OF HURTING YOURSELF: 0
3. TROUBLE FALLING OR STAYING ASLEEP: 1
10. IF YOU CHECKED OFF ANY PROBLEMS, HOW DIFFICULT HAVE THESE PROBLEMS MADE IT FOR YOU TO DO YOUR WORK, TAKE CARE OF THINGS AT HOME, OR GET ALONG WITH OTHER PEOPLE: 0
8. MOVING OR SPEAKING SO SLOWLY THAT OTHER PEOPLE COULD HAVE NOTICED. OR THE OPPOSITE, BEING SO FIGETY OR RESTLESS THAT YOU HAVE BEEN MOVING AROUND A LOT MORE THAN USUAL: 0
SUM OF ALL RESPONSES TO PHQ QUESTIONS 1-9: 5
SUM OF ALL RESPONSES TO PHQ QUESTIONS 1-9: 5
6. FEELING BAD ABOUT YOURSELF - OR THAT YOU ARE A FAILURE OR HAVE LET YOURSELF OR YOUR FAMILY DOWN: 1

## 2023-01-16 ASSESSMENT — LIFESTYLE VARIABLES
CIGARETTES_PER_DAY: 0.25PPD
SMOKELESS_TOBACCO: NO

## 2023-01-16 ASSESSMENT — EJECTION FRACTION: EF_VALUE: 55

## 2023-01-16 NOTE — PROGRESS NOTES
Dear Dr. Eloise Flores,    Thank you for referring your patient,Ms. Josephine Aguilar ( 1965), to the Cardiopulmonary Rehabilitation Program at [facility]. She is a good candidate for the Cardiac Rehab Program and should see improvements with regular participation. We will be addressing appropriate interventions for modifiable risk factors with your patient during the next 12 weeks. We will contact you with any issues or concerns that may arise, or you can follow your patient's progress through [address] [place] [address] at any time. A final summary will be sent to you when the program is completed. Again, thank you for the referral. If we can be of further assistance, please feel free to contact the Cardiopulmonary Rehab staff at [phone].     Sincerely,    THOMAS Aguiar, RN  Cardiopulmonary Rehabilitation Nurse Liaison  [facility]

## 2023-01-20 ENCOUNTER — CARE COORDINATION (OUTPATIENT)
Dept: CARE COORDINATION | Facility: CLINIC | Age: 58
End: 2023-01-20

## 2023-01-20 NOTE — CARE COORDINATION
[facility] Care Transitions Follow Up Call    N Care Coordinator contacted the patient by telephone to follow up after admission on 2023. Verified name and  with patient as identifiers. Patient: Desiree Stuart  Patient : 1965   MRN: [id number]  Reason for Admission: hypertension, chest pain  Discharge Date: 23 RARS: Readmission Risk Score: 3.8      Needs to be reviewed by the provider   Additional needs identified to be addressed with provider: No  none             Method of communication with provider: none. Addressed changes since last contact:  none  Discussed follow-up appointments. If no appointment was previously scheduled, appointment scheduling offered: Yes. Is follow up appointment scheduled within 7 days of discharge? No.    Follow Up  Future Appointments   Date Time Provider Krystina Yang   2023  9:00 AM Tegan Sosa MD MAT L AMB   2023 10:00 AM Regino Ruth MD [facility]     Non-[facility] follow up appointment(s): na    N Care Coordinator reviewed discharge instructions, medical action plan, and red flags with patient and discussed any barriers to care and/or understanding of plan of care after discharge. Discussed appropriate site of care based on symptoms and resources available to patient including: PCP  Specialist  Urgent care clinics  When to call [address] The patient agrees to contact the PCP office for questions related to their healthcare. Advance Care Planning:   reviewed and current. Patients top risk factors for readmission: medical condition-Hypertensive Emergency, Ischemia, HTN, Claustrophobia, HLD  Interventions to address risk factors: Obtained and reviewed discharge summary and/or continuity of care documents, Education of patient/family/caregiver/guardian to support self-management-Pamella Brunson LPN -[phone], and all scheduled appointments.     Offered patient enrollment in the Remote Patient Monitoring (RPM) program for in-home monitoring: NA.     Care Transitions Subsequent and Final Call    Subsequent and Final Calls  Do you have any ongoing symptoms?: No  Have your medications changed?: No  Do you have any questions related to your medications?: No  Do you currently have any active services?: No  Do you have any needs or concerns that I can assist you with?: No  Identified Barriers: None  Care Transitions Interventions  Other Interventions:             LPN Care Coordinator provided contact information for future needs. Plan for follow-up call in 7-10 days based on severity of symptoms and risk factors. Plan for next call: self management-diet, hydration, exercise, follow up appointment.      Italo Castellanos LPN

## 2023-01-22 ENCOUNTER — HOSPITAL ENCOUNTER (EMERGENCY)
Age: 58
Discharge: HOME | End: 2023-01-22
Attending: EMERGENCY MEDICINE
Payer: COMMERCIAL

## 2023-01-22 ENCOUNTER — TELEPHONE (OUTPATIENT)
Dept: CARDIOLOGY CLINIC | Age: 58
End: 2023-01-22

## 2023-01-22 ENCOUNTER — APPOINTMENT (OUTPATIENT)
Dept: ULTRASOUND IMAGING | Age: 58
End: 2023-01-22
Payer: COMMERCIAL

## 2023-01-22 VITALS
OXYGEN SATURATION: 98 % | WEIGHT: 175 LBS | BODY MASS INDEX: 32.2 KG/M2 | DIASTOLIC BLOOD PRESSURE: 89 MMHG | HEART RATE: 80 BPM | TEMPERATURE: 98.4 F | RESPIRATION RATE: 16 BRPM | HEIGHT: 62 IN | SYSTOLIC BLOOD PRESSURE: 162 MMHG

## 2023-01-22 DIAGNOSIS — M79.662 PAIN IN LEFT LOWER LEG: Primary | ICD-10-CM

## 2023-01-22 LAB
ALBUMIN SERPL-MCNC: 4.1 G/DL (ref 3.5–5)
ALBUMIN/GLOB SERPL: 1 (ref 0.4–1.6)
ALP SERPL-CCNC: 94 U/L (ref 50–136)
ALT SERPL-CCNC: 29 U/L (ref 12–65)
ANION GAP SERPL CALC-SCNC: 9 MMOL/L (ref 2–11)
AST SERPL-CCNC: 16 U/L (ref 15–37)
BASOPHILS # BLD: 0.1 K/UL (ref 0–0.2)
BASOPHILS NFR BLD: 1 % (ref 0–2)
BILIRUB SERPL-MCNC: 0.4 MG/DL (ref 0.2–1.1)
BUN SERPL-MCNC: 14 MG/DL (ref 6–23)
CALCIUM SERPL-MCNC: 9.4 MG/DL (ref 8.3–10.4)
CHLORIDE SERPL-SCNC: 104 MMOL/L (ref 101–110)
CO2 SERPL-SCNC: 26 MMOL/L (ref 21–32)
CREAT SERPL-MCNC: 1 MG/DL (ref 0.6–1)
DIFFERENTIAL METHOD BLD: ABNORMAL
EOSINOPHIL # BLD: 0.1 K/UL (ref 0–0.8)
EOSINOPHIL NFR BLD: 1 % (ref 0.5–7.8)
ERYTHROCYTE [DISTWIDTH] IN BLOOD BY AUTOMATED COUNT: 11.9 % (ref 11.9–14.6)
GLOBULIN SER CALC-MCNC: 4 G/DL (ref 2.8–4.5)
GLUCOSE SERPL-MCNC: 112 MG/DL (ref 65–100)
HCT VFR BLD AUTO: 45.3 % (ref 35.8–46.3)
HGB BLD-MCNC: 14.5 G/DL (ref 11.7–15.4)
IMM GRANULOCYTES # BLD AUTO: 0.1 K/UL (ref 0–0.5)
IMM GRANULOCYTES NFR BLD AUTO: 0 % (ref 0–5)
LYMPHOCYTES # BLD: 1.5 K/UL (ref 0.5–4.6)
LYMPHOCYTES NFR BLD: 13 % (ref 13–44)
MCH RBC QN AUTO: 29.5 PG (ref 26.1–32.9)
MCHC RBC AUTO-ENTMCNC: 32 G/DL (ref 31.4–35)
MCV RBC AUTO: 92.3 FL (ref 82–102)
MONOCYTES # BLD: 0.6 K/UL (ref 0.1–1.3)
MONOCYTES NFR BLD: 5 % (ref 4–12)
NEUTS SEG # BLD: 9.1 K/UL (ref 1.7–8.2)
NEUTS SEG NFR BLD: 80 % (ref 43–78)
NRBC # BLD: 0 K/UL (ref 0–0.2)
PLATELET # BLD AUTO: 347 K/UL (ref 150–450)
PMV BLD AUTO: 11.1 FL (ref 9.4–12.3)
POTASSIUM SERPL-SCNC: 4.2 MMOL/L (ref 3.5–5.1)
PROT SERPL-MCNC: 8.1 G/DL (ref 6.3–8.2)
RBC # BLD AUTO: 4.91 M/UL (ref 4.05–5.2)
SODIUM SERPL-SCNC: 139 MMOL/L (ref 133–143)
WBC # BLD AUTO: 11.4 K/UL (ref 4.3–11.1)

## 2023-01-22 PROCEDURE — 80053 COMPREHEN METABOLIC PANEL: CPT

## 2023-01-22 PROCEDURE — 85025 COMPLETE CBC W/AUTO DIFF WBC: CPT

## 2023-01-22 PROCEDURE — 99284 EMERGENCY DEPT VISIT MOD MDM: CPT

## 2023-01-22 PROCEDURE — 93971 EXTREMITY STUDY: CPT

## 2023-01-22 ASSESSMENT — ENCOUNTER SYMPTOMS
DIARRHEA: 0
VOMITING: 0
NAUSEA: 0
COLOR CHANGE: 0
ABDOMINAL PAIN: 0
SHORTNESS OF BREATH: 0

## 2023-01-22 ASSESSMENT — PAIN - FUNCTIONAL ASSESSMENT: PAIN_FUNCTIONAL_ASSESSMENT: 0-10

## 2023-01-22 ASSESSMENT — PAIN SCALES - GENERAL: PAINLEVEL_OUTOF10: 2

## 2023-01-22 ASSESSMENT — PAIN DESCRIPTION - LOCATION: LOCATION: LEG

## 2023-01-22 NOTE — TELEPHONE ENCOUNTER
Patient reports a \"big knot coming up\" below the left knee. She is concerned for a \"clot\". Recommended she go to the ER.

## 2023-01-22 NOTE — ED PROVIDER NOTES
Emergency Department Provider Note                   PCP:                Isabel Muhammad MD               Age: 59 y.o. Sex: female       ICD-10-CM    1. Pain in left lower leg  M79.662           DISPOSITION Decision To Discharge 01/22/2023 07:42:22 PM       Medical Decision Making  Vital signs reviewed, patient stable, NAD, afebrile, nontoxic in appearance     53-year-old female presents for concern for DVT in her left lower leg due to bruising that appeared out of nowhere today. Contacted her cardiologist due to recent stent placement 10 days ago due to concern for DVT and cardiologist referred to emergency    Will obtain basic lab work and an ultrasound of left lower extremity to rule out DVT to physical exam is very reassuring. No tenderness to palpation to left calf. No unilateral leg swelling. No bilateral leg swelling or edema. Patient does have a 4 cm x 4 cm area of ecchymosis and medial aspect of proximal left lower leg just distal to the knee. She is tender to palpation at the bruise. No erythema, induration, rash or warmth to the area. No concerning findings on CMP, normal creatinine at 9, GFR and BUN  No significant leukocytosis, no anemia noted on CBC    Ultrasound of left lower extremity is negative for DVT    Based on history, physical exam, I do not feel additional lab work or imaging is warranted at this time. No urgent or emergent findings. Patient is stable and can be discharged home with follow-up to primary care. Patient's physical exam appears consistent with contusion. DVT study negative. Patient can follow-up with primary care and her cardiologist.    I discussed physical exam findings, most likely diagnosis with patient prior to ordering DVT study. Discussed with patient that it is likely just a bruise, but due to her recent procedure, will obtain ultrasound study.   Patient is in agreement with this    Included signs and symptoms that would warrant a prompt return to the emergency department. Patient discharged home in stable condition. She is to follow-up with her primary care provider and cardiologist.  Return to ED precautions reiterated. History obtained from patient  Reviewed notes and procedure note from cardiologist  Ordered and reviewed lab work today  Ordered and reviewed imaging today  No indication for EKG              Amount and/or Complexity of Data Reviewed  External Data Reviewed: notes. Labs: ordered. Decision-making details documented in ED Course. Radiology: ordered and independent interpretation performed. Decision-making details documented in ED Course. ECG/medicine tests: ordered. Risk  OTC drugs. Prescription drug management. I have reviewed records from an external source: provider visit notes from outside specialist.  Considerations: Shared decision making was utilized in the care of this patient. ED Course as of 01/22/23 2311   Erik Acosta Jan 22, 2023   1913 Ultrasound exam begun at 1908 [JG]      ED Course User Index  [JG] REJI Lee        Orders Placed This Encounter   Procedures    Comprehensive Metabolic Panel    CBC with Auto Differential    Vascular duplex lower extremity venous left        Medications - No data to display    Discharge Medication List as of 1/22/2023  7:44 PM           Gonzalo Daley is a 59 y.o. female who presents to the Emergency Department with chief complaint of    Chief Complaint   Patient presents with    Leg Pain      47-year-old female with history of hypertension, hyperlipidemia with recent heart cath 1/10/22 with stent placement who is on Brilinta presents to the emergency department today with chief complaint of concern for blood clot in her left lower extremity. Patient states that she was at lunch today and she noted some pain and aching in her left lower extremity and noticed a bruise on her lower leg just below her knee on the inside.   Patient denies any known injury. Patient states she called cardiology due to concern for blood clot and they recommended she come to the emergency department. Patient denies any swelling of her lower extremities, chest pain or shortness of breath. Patient denies any numbness or tingling in her lower leg or fevers or chills. The history is provided by the patient. No  was used. Review of Systems   Constitutional:  Negative for chills, fatigue and fever. Respiratory:  Negative for shortness of breath. Cardiovascular:  Negative for chest pain and palpitations. Gastrointestinal:  Negative for abdominal pain, diarrhea, nausea and vomiting. Musculoskeletal:         Pain with bruise left lower extremity   Skin:  Negative for color change. Neurological:  Negative for weakness, numbness and headaches. All other systems reviewed and are negative.     Past Medical History:   Diagnosis Date    CAD (coronary artery disease)     Dizziness 7/28/2017    Ear problems     Hearing reduced     Hyperlipidemia 12/9/2015    Hypertension     Other ill-defined conditions(799.89)     Syncope with sight of blood        Past Surgical History:   Procedure Laterality Date    CARDIAC PROCEDURE N/A 1/10/2023    LEFT HEART CATH / CORONARY ANGIOGRAPHY performed by Jeanine Duarte MD at [facility] CATH LAB    CARDIAC PROCEDURE N/A 1/10/2023    Percutaneous coronary intervention performed by Jeanine Duatre MD at [facility] CATH LAB    HYSTERECTOMY ([address])          Family History   Problem Relation Age of Onset    Elevated Lipids Mother     Hypertension Mother     Coronary Art Dis Mother     Coronary Art Dis Father         premature         Social History     Socioeconomic History    Marital status: Single     Spouse name: None    Number of children: None    Years of education: None    Highest education level: None   Tobacco Use    Smoking status: Former     Packs/day: 0.25     Years: 8.00     Pack years: 2.00 Types: Cigarettes     Start date: 1983     Quit date: 1991     Years since quittin.6    Smokeless tobacco: Never   Vaping Use    Vaping Use: Never used   Substance and Sexual Activity    Alcohol use: No    Drug use: No        Allergies: Ciprofloxacin, Nitrofurantoin, Sulfa antibiotics, Influenza vaccines, Cimetidine, and Clindamycin hcl    Discharge Medication List as of 2023  7:44 PM        CONTINUE these medications which have NOT CHANGED    Details   losartan (COZAAR) 50 MG tablet Take 1 tablet by mouth in the morning and 1 tablet in the evening., Disp-60 tablet, R-0Normal      rosuvastatin (CRESTOR) 20 MG tablet Take 1 tablet by mouth nightly, Disp-30 tablet, R-0Normal      amLODIPine (NORVASC) 5 MG tablet Take 1 tablet by mouth in the morning and 1 tablet in the evening., Disp-60 tablet, R-0Normal      ticagrelor (BRILINTA) 90 MG TABS tablet Take 1 tablet by mouth 2 times daily, Disp-60 tablet, R-0Normal      aspirin 81 MG chewable tablet Take 1 tablet by mouth daily, Disp-30 tablet, R-0Print      estradiol (VIVELLE) 0.1 MG/24HR Place 1 patch onto the skin Twice a Week, Disp-8 patch, R-3Normal              Vitals signs and nursing note reviewed. No data found. Physical Exam  Vitals and nursing note reviewed. Constitutional:       General: She is not in acute distress. Appearance: Normal appearance. She is obese. She is not ill-appearing, toxic-appearing or diaphoretic. HENT:      Head: Atraumatic. Eyes:      General: No scleral icterus. Extraocular Movements: Extraocular movements intact. Conjunctiva/sclera: Conjunctivae normal.   Cardiovascular:      Rate and Rhythm: Normal rate. Pulses: Normal pulses. Heart sounds: Normal heart sounds. Comments: 2+ and equal bilateral PT pulses  Pulmonary:      Effort: Pulmonary effort is normal.      Breath sounds: Normal breath sounds.    Musculoskeletal:         General: No swelling (No swelling noted of left lower extremity) or tenderness (No tenderness to palpation along calf of left lower extremity). Normal range of motion. Cervical back: Normal range of motion. Right lower leg: No edema. Left lower leg: No edema. Legs:    Skin:     General: Skin is warm and dry. Capillary Refill: Capillary refill takes less than 2 seconds. Findings: Bruising (4 cm x 4 cm area of ecchymosis medial aspect of proximal left lower leg just below left knee with some tenderness to palpation) present. Neurological:      General: No focal deficit present. Mental Status: She is alert and oriented to person, place, and time. Comments: Bilateral lower extremity sensation intact and equal  Bilateral lower extremity motor strength 5+ and equal   Psychiatric:         Mood and Affect: Mood normal.         Behavior: Behavior normal.         Thought Content:  Thought content normal.         Judgment: Judgment normal.        Procedures      Results for orders placed or performed during the hospital encounter of 01/22/23   Comprehensive Metabolic Panel   Result Value Ref Range    Sodium 139 133 - 143 mmol/L    Potassium 4.2 3.5 - 5.1 mmol/L    Chloride 104 101 - 110 mmol/L    CO2 26 21 - 32 mmol/L    Anion Gap 9 2 - 11 mmol/L    Glucose 112 (H) 65 - 100 mg/dL    BUN 14 6 - 23 MG/DL    Creatinine 1.00 0.6 - 1.0 MG/DL    Est, Glom Filt Rate >60 >60 ml/min/1.73m2    Calcium 9.4 8.3 - 10.4 MG/DL    Total Bilirubin 0.4 0.2 - 1.1 MG/DL    ALT 29 12 - 65 U/L    AST 16 15 - 37 U/L    Alk Phosphatase 94 50 - 136 U/L    Total Protein 8.1 6.3 - 8.2 g/dL    Albumin 4.1 3.5 - 5.0 g/dL    Globulin 4.0 2.8 - 4.5 g/dL    Albumin/Globulin Ratio 1.0 0.4 - 1.6     CBC with Auto Differential   Result Value Ref Range    WBC 11.4 (H) 4.3 - 11.1 K/uL    RBC 4.91 4.05 - 5.2 M/uL    Hemoglobin 14.5 11.7 - 15.4 g/dL    Hematocrit 45.3 35.8 - 46.3 %    MCV 92.3 82 - 102 FL    MCH 29.5 26.1 - 32.9 PG    MCHC 32.0 31.4 - 35.0 g/dL    RDW 11.9 11.9 - 14.6 %    Platelets 409 362 - 338 K/uL    MPV 11.1 9.4 - 12.3 FL    nRBC 0.00 0.0 - 0.2 K/uL    Differential Type AUTOMATED      Seg Neutrophils 80 (H) 43 - 78 %    Lymphocytes 13 13 - 44 %    Monocytes 5 4.0 - 12.0 %    Eosinophils % 1 0.5 - 7.8 %    Basophils 1 0.0 - 2.0 %    Immature Granulocytes 0 0.0 - 5.0 %    Segs Absolute 9.1 (H) 1.7 - 8.2 K/UL    Absolute Lymph # 1.5 0.5 - 4.6 K/UL    Absolute Mono # 0.6 0.1 - 1.3 K/UL    Absolute Eos # 0.1 0.0 - 0.8 K/UL    Basophils Absolute 0.1 0.0 - 0.2 K/UL    Absolute Immature Granulocyte 0.1 0.0 - 0.5 K/UL   Vascular duplex lower extremity venous left    Narrative    Left lower extremity venous duplex exam.    CLINICAL INDICATION: Left leg swelling. PROCEDURE: Real-time grayscale, color, and spectral Doppler analysis of the left  lower extremity deep venous system from the common femoral vein through the  posterior tibial and peroneal veins. COMPARISON: No prior. FINDINGS: There is normal compressibility and flow dynamics appreciated from the  left common femoral vein through the posterior tibial and peroneal veins. No  echogenic intraluminal filling defects noted to indicate DVT. Impression    Negative left lower extremity venous duplex exam. No evidence for  DVT. Vascular duplex lower extremity venous left   Final Result   Negative left lower extremity venous duplex exam. No evidence for   DVT. Voice dictation software was used during the making of this note. This software is not perfect and grammatical and other typographical errors may be present. This note has not been completely proofread for errors.       Krupa Bhandari, [place]  01/22/23 1615

## 2023-01-22 NOTE — ED TRIAGE NOTES
Pt to ED from home. Pt had stent placed 2 weeks ago. Pt taking brilinta. Pt states today at lunch and started having left calf pain. Pt states bruise on inner left calf with knot on palpitation. Bruising and knot noted in triage. Pain on palpitation. Pt denies pain anywhere but area of bruise. Pt denies any CP, SOB, dizziness, NVD or HA. Pain worsens with touch or ambulation.

## 2023-01-23 NOTE — DISCHARGE INSTRUCTIONS
Evaluated in the emergency department today for pain in your left lower leg with a bruise  Lab work today is very reassuring  Ultrasound of your left lower extremity is negative for blood clot    Contact your primary care provider to schedule follow-up within the next week  Contact cardiology to schedule follow-up within the next week or 2  Return to the emergency department if you have chest pain, shortness of breath, signs and symptoms of stroke as listed in your discharge paperwork  , Swelling of one of your legs, redness or warmth to your lower extremities with fevers or chills or general worsening of your condition

## 2023-01-23 NOTE — ED NOTES
I have reviewed discharge instructions with the patient. The patient verbalized understanding. Patient left ED via Discharge Method: ambulatory to Home with self. Opportunity for questions and clarification provided. Patient given 0 scripts. To continue your aftercare when you leave the hospital, you may receive an automated call from our care team to check in on how you are doing. This is a free service and part of our promise to provide the best care and service to meet your aftercare needs.  If you have questions, or wish to unsubscribe from this service please call [phone]. Thank you for Choosing our [facility] Emergency Department.         Marie Denton RN  01/22/23 3251

## 2023-01-26 ENCOUNTER — CARE COORDINATION (OUTPATIENT)
Dept: CARE COORDINATION | Facility: CLINIC | Age: 58
End: 2023-01-26

## 2023-01-26 NOTE — CARE COORDINATION
Care Transitions Outreach Attempt    Call within 2 business days of discharge: Yes   Attempted to reach patient for transitions of care follow up. Unable to reach patient. Patient: Victoriano Silverio Patient : 1965 MRN: [id number]    Last Discharge [address]       Date Complaint Diagnosis Description Type Department Provider    23 Leg Pain Pain in left lower leg ED (DISCHARGE) DELMAR Sarabia MD              Was this an external facility discharge? No Discharge Facility: [facility]    Noted following upcoming appointments from discharge chart review:   [facility] follow up appointment(s):   Future Appointments   Date Time Provider Krystina Yang   2023 11:00 AM [address] [place] [address] [facility]   2023  9:00 AM Maribeth Urena MD [facility] AMB   2023 10:00 AM Nabila Cross MD [facility]     Non-[facility] follow up appointment(s): na    LIZZETH outreach follow up call. Left message, identified self, reason for call, return call contact information, Gaviota Rosado DODIE [address] [place] [address].

## 2023-01-31 ENCOUNTER — HOSPITAL ENCOUNTER (OUTPATIENT)
Dept: CARDIAC REHAB | Age: 58
Setting detail: RECURRING SERIES
Discharge: HOME | End: 2023-02-03
Payer: COMMERCIAL

## 2023-01-31 VITALS — OXYGEN SATURATION: 99 % | BODY MASS INDEX: 32.02 KG/M2 | HEIGHT: 62 IN | WEIGHT: 174 LBS

## 2023-01-31 PROCEDURE — 93798 PHYS/QHP OP CAR RHAB W/ECG: CPT

## 2023-01-31 ASSESSMENT — LIFESTYLE VARIABLES
CIGARETTES_PER_DAY: 0.25PPD
SMOKELESS_TOBACCO: NO

## 2023-01-31 ASSESSMENT — EXERCISE STRESS TEST
PEAK_HR: 102
PEAK_METS: 2.5
PEAK_BP: 168/84
PEAK_HR: 101
PEAK_RPE: 11
PEAK_BP: 168/84
PEAK_BP: 168/84

## 2023-01-31 ASSESSMENT — EJECTION FRACTION
EF_VALUE: 55
EF_VALUE: 55

## 2023-02-01 ENCOUNTER — OFFICE VISIT (OUTPATIENT)
Dept: INTERNAL MEDICINE CLINIC | Facility: CLINIC | Age: 58
End: 2023-02-01
Payer: COMMERCIAL

## 2023-02-01 VITALS
BODY MASS INDEX: 32.39 KG/M2 | OXYGEN SATURATION: 100 % | DIASTOLIC BLOOD PRESSURE: 82 MMHG | TEMPERATURE: 97.7 F | HEART RATE: 65 BPM | WEIGHT: 176 LBS | SYSTOLIC BLOOD PRESSURE: 130 MMHG | HEIGHT: 62 IN

## 2023-02-01 DIAGNOSIS — E78.2 MIXED HYPERLIPIDEMIA: ICD-10-CM

## 2023-02-01 DIAGNOSIS — F41.8 ANXIETY ABOUT HEALTH: ICD-10-CM

## 2023-02-01 DIAGNOSIS — I24.8 DEMAND ISCHEMIA (HCC): Primary | ICD-10-CM

## 2023-02-01 DIAGNOSIS — I10 ESSENTIAL HYPERTENSION: ICD-10-CM

## 2023-02-01 PROCEDURE — 3075F SYST BP GE 130 - 139MM HG: CPT | Performed by: INTERNAL MEDICINE

## 2023-02-01 PROCEDURE — 3079F DIAST BP 80-89 MM HG: CPT | Performed by: INTERNAL MEDICINE

## 2023-02-01 PROCEDURE — 99214 OFFICE O/P EST MOD 30 MIN: CPT | Performed by: INTERNAL MEDICINE

## 2023-02-01 RX ORDER — BUSPIRONE HYDROCHLORIDE 7.5 MG/1
7.5 TABLET ORAL 2 TIMES DAILY PRN
Qty: 60 TABLET | Refills: 5 | Status: SHIPPED | OUTPATIENT
Start: 2023-02-01 | End: 2023-07-31

## 2023-02-01 RX ORDER — ASPIRIN 81 MG/1
81 TABLET, CHEWABLE ORAL DAILY
Qty: 90 TABLET | Refills: 3 | Status: SHIPPED | OUTPATIENT
Start: 2023-02-01

## 2023-02-01 ASSESSMENT — ENCOUNTER SYMPTOMS
SHORTNESS OF BREATH: 0
CHEST TIGHTNESS: 0

## 2023-02-01 NOTE — PROGRESS NOTES
ASSESSMENT/PLAN:    Emphasized to take aspirin and Brilinta as prescribed. Continue diet and exercise regimens and cardiac rehab. Emphasized to take Crestor as prescribed. BuSpar 7.5 mg twice daily as needed for anxiety due to health crisis. If worsens, consider SSRI. Encouraged healthy diet. A healthy diet to consider is a more mediterranean diet which is abundant in fruits, vegetables, whole grains, legumes and olive oil. It features fish and poultry, lean sources of protein over red meat. The importance of a healthy lifestyle are discussed. Limit high fructose containing foods, moderate portion sizes,  regular cardiovascular exercise (walking, swimming, aerobics) for 30-45 minutes, 3-4 times weekly. Cardiac cath notes and procedure reviewed. Right radial cath site looks great. Discontinue pseudoephedrine for allergies. We will prefer her use meclizine or Allegra or Zyrtec without pseudoephedrine for now. I will plan to follow her up in the office in about 3 months with lab work. Monitor blood pressure call with any concerns. Evaluation and management of the chronic condition(s) delineated. No negative side effects reported. I have reviewed all the lab results. There are some abnormalities that are either expected or not critical to the patient's health, and are discussed in the office today and are addressed. Please refer to the above assessement and plan narrative and orders and follow up plan. Medication discussed and refilled as needed. Physical exam findings are stable unless otherwise indicated and this is addressed. The most recent lab work and imaging and consultant/urgent care visits and imaging are reviewed and discussed and considered during this visit encounter. 1. Demand ischemia (HCC)  -     aspirin 81 MG chewable tablet; Take 1 tablet by mouth daily, Disp-90 tablet, R-3Normal  -     CBC; Future  -     Lipid Panel;  Future  -     Basic Metabolic Panel; Future  -     ALT; Future  -     AST; Future  2. Mixed hyperlipidemia  -     CBC; Future  -     Lipid Panel; Future  -     Basic Metabolic Panel; Future  -     ALT; Future  -     AST; Future  3. Essential hypertension  -     CBC; Future  -     Lipid Panel; Future  -     Basic Metabolic Panel; Future  -     ALT; Future  -     AST; Future  4. Anxiety about health  -     busPIRone (BUSPAR) 7.5 MG tablet; Take 1 tablet by mouth 2 times daily as needed (anxiety), Disp-60 tablet, R-5Normal  -     CBC; Future  -     Lipid Panel; Future  -     Basic Metabolic Panel; Future  -     ALT; Future  -     AST; Future       On this date, 23, I have spent 30 minutes reviewing previous notes, test results and face to face with the patient discussing the diagnosis and importance of compliance with the treatment plan as well as documenting on the day of the visit. An electronic signature was used to authenticate this note. -- Bria Sanford MD     Return in about 3 months (around 2023). SUBJECTIVE/OBJECTIVE:      HPI:   Leslie Kessler (: 1965 is a 59 y.o. female, here for evaluation of the following chief complaint(s):   Chief Complaint   Patient presents with    Hypertension     6 month recheck    Cholesterol Problem    Medication Refill       Patient is here for follow-up and management of chronic medical conditions, review of recent labs, review of any imaging completed since our last office visit and discuss any consultants opinions or management changes. No cp or sob. BP improved. S/p cath. On ASA 81 mg and Brilinta and Crestor 20 mg  Key CAD CHF Meds            losartan (COZAAR) 50 MG tablet (Taking)    Sig - Route: Take 1 tablet by mouth in the morning and 1 tablet in the evening. - Oral    rosuvastatin (CRESTOR) 20 MG tablet (Taking)    Sig - Route: Take 1 tablet by mouth nightly - Oral    amLODIPine (NORVASC) 5 MG tablet (Taking)    Sig - Route:  Take 1 tablet by mouth in the morning and 1 tablet in the evening. - Oral          No results found for this or any previous visit. 01/09/23    CARDIAC PROCEDURE 01/10/2023  3:37 PM (Final)    Conclusion    Right radial access with pneumatic band hemostasis    Patient with normal left ventricular systolic function and no valvular heart disease by echocardiogram.  Patient has normal left ventricular end-diastolic pressure. Normal-appearing left main and LAD system. Mild nonobstructive disease involving the first obtuse marginal.    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 There was no compromise of the PDA after stenting performed. Signed by: Louise Paris MD on 1/10/2023  3:37 PM      Labs reviewed and discussed and medication refilled as needed for chronic medications during ov or adjusted based on lab results and/or our discussion as appropriate. See discussion. The patient's available records and electronic chart records are reviewed. The PMH, PSH, medications, allergies, medications, FH, health maintenance and vaccination status are all reviewed and updated as appropriate. Records from outside providers have been reviewed, summarized, and considered as noted in the history of present illness, past medical history, and objective data of this note and encounter.           Health Maintenance   Topic Date Due    Diabetes screen  Never done    Breast cancer screen  Never done    Shingles vaccine (1 of 2) Never done    COVID-19 Vaccine (3 - Booster for Pfizer series) 06/01/2021    Lipids  01/10/2024    Depression Screen  01/16/2024    Colorectal Cancer Screen  08/28/2027    DTaP/Tdap/Td vaccine (2 - Td or Tdap) 06/07/2028    Hepatitis A vaccine  Aged Out    Hib vaccine  Aged Out    Meningococcal (ACWY) vaccine  Aged Out    Pneumococcal 0-64 years Vaccine  Aged Out    Hepatitis C screen  Discontinued    HIV screen Discontinued     Patient Active Problem List   Diagnosis    Hyperlipidemia    Essential hypertension    Post-menopausal    Vertigo    Establishing care with new doctor, encounter for    Family history of neurofibromatosis    Claustrophobia    Hypertensive emergency    Elevation of cardiac enzymes    Leukocytosis    Demand ischemia ([address]       Review of Systems   Constitutional:  Negative for activity change and fatigue. Eyes:  Negative for visual disturbance. Respiratory:  Negative for chest tightness and shortness of breath. Cardiovascular:  Negative for chest pain, palpitations and leg swelling. Endocrine: Negative for polydipsia and polyuria. Genitourinary:  Negative for dysuria. Musculoskeletal:  Negative for myalgias. Skin:  Negative for wound. Neurological:  Negative for syncope and headaches. Psychiatric/Behavioral:  Negative for dysphoric mood. The patient is nervous/anxious.       Lab Results   Component Value Date/Time    WBC 11.4 01/22/2023 05:52 PM    HGB 14.5 01/22/2023 05:52 PM    HCT 45.3 01/22/2023 05:52 PM    MCV 92.3 01/22/2023 05:52 PM    RDW 11.9 01/22/2023 05:52 PM     01/22/2023 05:52 PM    NEUTOPHILPCT 63 06/16/2021 10:15 AM    LYMPHOPCT 13 01/22/2023 05:52 PM    LYMPHOPCT 25 06/16/2021 10:15 AM    MONOPCT 5 01/22/2023 05:52 PM    MONOPCT 9 06/16/2021 10:15 AM    EOSRELPCT 1 01/22/2023 05:52 PM    BASOPCT 1 01/22/2023 05:52 PM    BASOPCT 1 06/16/2021 10:15 AM    LYMPHSABS 1.5 01/22/2023 05:52 PM    LYMPHSABS 1.8 06/16/2021 10:15 AM    MONOSABS 0.6 01/22/2023 05:52 PM    MONOSABS 0.7 06/16/2021 10:15 AM    EOSABS 0.1 01/22/2023 05:52 PM    EOSABS 0.1 06/16/2021 10:15 AM    BASOSABS 0.1 01/22/2023 05:52 PM    IMMGRAN 0 01/22/2023 05:52 PM    GRANULOCYTEABSOLUTECOUNT 0.0 06/16/2021 10:15 AM       Lab Results   Component Value Date/Time     01/22/2023 05:52 PM    K 4.2 01/22/2023 05:52 PM     01/22/2023 05:52 PM    CO2 26 01/22/2023 05:52 PM    ANIONGAP 9 01/22/2023 05:52 PM    GLUCOSE 112 01/22/2023 05:52 PM    BUN 14 01/22/2023 05:52 PM    CREATININE 1.00 01/22/2023 05:52 PM    GFRAA >60 06/21/2022 03:51 PM    LABGLOM >60 01/22/2023 05:52 PM    CALCIUM 9.4 01/22/2023 05:52 PM    BILITOT 0.4 01/22/2023 05:52 PM    ALT 29 01/22/2023 05:52 PM    AST 16 01/22/2023 05:52 PM    ALKPHOS 94 01/22/2023 05:52 PM    ALKPHOS 91 06/16/2021 10:15 AM    PROT 8.1 01/22/2023 05:52 PM    LABALBU 4.1 01/22/2023 05:52 PM    GLOB 4.0 01/22/2023 05:52 PM    ALBUMIN 1.0 01/22/2023 05:52 PM       Lab Results   Component Value Date/Time    CHOL 248 01/10/2023 06:51 AM    HDL 34 01/10/2023 06:51 AM    TRIG 249 01/10/2023 06:51 AM    LDLCALC 164.2 01/10/2023 06:51 AM    VLDL 71 06/16/2021 10:15 AM       No results found for: LABA1C    Lab Results   Component Value Date/Time    TSH 1.580 06/16/2021 10:15 AM       No results found for: PSA    Lab Results   Component Value Date/Time    SPECGRAV 1.013 06/21/2022 03:51 PM    COLORU YELLOW/STRAW 06/21/2022 03:51 PM    LEUKOCYTESUR Negative 06/21/2022 03:51 PM    PROTEINU Negative 06/21/2022 03:51 PM    GLUCOSEU Negative 01/09/2023 07:33 PM    GLUCOSEU Negative 06/21/2022 03:51 PM    KETUA Negative 06/21/2022 03:51 PM    BLOODU MODERATE 01/09/2023 07:33 PM    BLOODU Negative 06/21/2022 03:51 PM    BILIRUBINUR Negative 06/21/2022 03:51 PM    UROBILINOGEN 0.2 06/21/2022 03:51 PM    NITRU Negative 06/21/2022 03:51 PM           Vitals:    02/01/23 0851   BP: 130/82   Site: Left Upper Arm   Position: Sitting   Cuff Size: Small Adult   Pulse: 65   Temp: 97.7 °F (36.5 °C)   TempSrc: Skin   SpO2: 100%   Weight: 176 lb (79.8 kg)   Height: 5' 2\" (1.575 m)     Wt Readings from Last 3 Encounters:   02/01/23 176 lb (79.8 kg)   01/31/23 174 lb (78.9 kg)   01/22/23 175 lb (79.4 kg)     BP Readings from Last 3 Encounters:   02/01/23 130/82   01/22/23 (!) 162/89   01/11/23 (!) 177/77     Physical Exam  Vitals and nursing note reviewed.   Constitutional:        Appearance: Normal appearance. She is not ill-appearing. HENT:      Head: Normocephalic and atraumatic. Eyes:      Extraocular Movements: Extraocular movements intact. Conjunctiva/sclera: Conjunctivae normal.   Cardiovascular:      Rate and Rhythm: Normal rate and regular rhythm. Pulses: Normal pulses. Heart sounds: Normal heart sounds. No murmur heard. No friction rub. No gallop. Pulmonary:      Effort: Pulmonary effort is normal.      Breath sounds: Normal breath sounds. Musculoskeletal:      Right lower leg: No edema. Left lower leg: No edema. Neurological:      General: No focal deficit present. Mental Status: She is alert and oriented to person, place, and time. Mental status is at baseline.    Psychiatric:         Mood and Affect: Mood normal.         Behavior: Behavior normal.

## 2023-02-02 ENCOUNTER — CARE COORDINATION (OUTPATIENT)
Dept: CARE COORDINATION | Facility: CLINIC | Age: 58
End: 2023-02-02

## 2023-02-02 ENCOUNTER — HOSPITAL ENCOUNTER (OUTPATIENT)
Dept: CARDIAC REHAB | Age: 58
Setting detail: RECURRING SERIES
Discharge: HOME | End: 2023-02-05
Payer: COMMERCIAL

## 2023-02-02 ENCOUNTER — APPOINTMENT (OUTPATIENT)
Dept: CARDIAC REHAB | Age: 58
End: 2023-02-02
Payer: COMMERCIAL

## 2023-02-02 PROCEDURE — 93798 PHYS/QHP OP CAR RHAB W/ECG: CPT

## 2023-02-02 ASSESSMENT — EXERCISE STRESS TEST
PEAK_METS: 2.6
PEAK_BP: 148/80
PEAK_HR: 112

## 2023-02-02 NOTE — CARE COORDINATION
[facility] Care Transitions Follow Up Call    LPN Care Coordinator contacted the patient by telephone to follow up after admission on 2023. Verified name and  with patient as identifiers. Patient: Yasmeen Sandoval  Patient : 1965   MRN: [id number]  Reason for Admission: hypertension, chest pain  Discharge Date: 23 RARS: Readmission Risk Score: 3.8      Needs to be reviewed by the provider   Additional needs identified to be addressed with provider: No  none             Method of communication with provider: none. Addressed changes since last contact:  none  Discussed follow-up appointments. If no appointment was previously scheduled, appointment scheduling offered: Yes. Is follow up appointment scheduled within 7 days of discharge?  No.    Follow Up  Future Appointments   Date Time Provider Krystina Yang   2023 10:00 AM Geetaraquel Chinchilla, ANNELIESE [facility]   2023  9:00 AM LUIS ALBERTO SlaughterOCPRALISSA [facility]   2023 10:00 AM Geeta Clause, ANNELIESE [facility]   2023 10:00 AM Geeta Clause, RN SFOCPRHB [facility]   2023 10:00 AM Geeta Clause, RN SFOCPRHB O   2023 10:00 AM Nadine Hernandez MD UCD[facility]   2023 10:00 AM Geeta Clause, RN [facility] O   2023 10:00 AM Geeta Clause, RN [facility] O   2023 10:00 AM Geeta Clause, RN [facility] O   3/2/2023 10:00 AM Geeta Clause, RN [facility] O   3/7/2023 10:00 AM Geeta Clause, RN [facility] O   3/9/2023 10:00 AM Geeta Clause, RN [facility] O   3/14/2023 10:00 AM Geeta Clause, RN [facility] O   3/16/2023 10:00 AM Geeta Clause, RN [facility]   3/21/2023 10:00 AM Geeta Clause, RN [facility]   3/23/2023 10:00 AM Geeta Chinchilla RN [facility]   3/28/2023 10:00 AM Geeta Chinchilla RN [facility]   3/30/2023 10:00 AM Geeta Chinchilla RN [facility]   2023 10:00 AM Jacqulynn Petties, RN [facility]PRHB O   4/6/2023 10:00 AM Jacqulynn Petties, RN [facility] O   4/11/2023 10:00 AM Jacqulynn Petties, RN [facility] O   4/13/2023 10:00 AM Jacqulynn Petties, RN [facility] O   4/18/2023 10:00 AM Jacqulynn Petties, RN [facility] O   4/20/2023 10:00 [facility]   4/25/2023 10:00 AM [facility]   4/27/2023 10:00 AM Jacqulynn Petties, RN SFOCPRHB O   5/2/2023 10:00 AM Jacqulynn Petties, RN [facility] O   5/4/2023  8:45 AM SFE LAB DS [facility] E   5/4/2023 10:00 AM Jacqulynn Petties, RN SFOCPRHB O   5/9/2023 10:00 AM Jacqulynn Petties, RN [facility]   5/11/2023  9:40 AM Chio James MD MAT [facility] AMB   5/11/2023 10:00 AM Jacqulynn Petties, RN ASMITAOCPRHB O   5/16/2023 10:00 AM Jacqulynn Petties, RN [facility]   5/18/2023 10:00 AM [facility]   5/23/2023 10:00 AM Jacqulynn Petties, RN [facility]   5/25/2023 10:00 AM Jacqulynn Petties, RN [facility]   5/30/2023 10:00 AM [facility]   6/1/2023 10:00 AM Jacqulynn Petties, RN [facility]PR[facility]     [facility] follow up appointment(s): whit    LPN Care Coordinator reviewed discharge instructions, medical action plan, and red flags with patient and discussed any barriers to care and/or understanding of plan of care after discharge. Discussed appropriate site of care based on symptoms and resources available to patient including: PCP  Specialist  Urgent care clinics  When to call [address] The patient agrees to contact the PCP office for questions related to their healthcare. Advance Care Planning:   not on file.      Patients top risk factors for readmission: medical condition-Hypertensive Emergency, Ischemia, HTN, Claustrophobia, HLD  Interventions to address risk factors: Obtained and reviewed discharge summary and/or continuity of care documents, Education of patient/family/caregiver/guardian to support self-management-Pamella Brunson  [facility] [phone], and all scheduled appointments. Offered patient enrollment in the Remote Patient Monitoring (RPM) program for in-home monitoring: NA.     Care Transitions Subsequent and Final Call    Subsequent and Final Calls  Do you have any ongoing symptoms?: No  Have your medications changed?: No  Do you have any questions related to your medications?: No  Do you currently have any active services?: No  Do you have any needs or concerns that I can assist you with?: No  Identified Barriers: None  Care Transitions Interventions  Other Interventions:             LPN Care Coordinator provided contact information for future needs. Plan for follow-up call in 7-10 days based on severity of symptoms and risk factors. Plan for next call: self management-diet, hydration, exercise, follow up appointments.      Fatoumata Fritz LPN

## 2023-02-07 ENCOUNTER — HOSPITAL ENCOUNTER (OUTPATIENT)
Dept: CARDIAC REHAB | Age: 58
Setting detail: RECURRING SERIES
Discharge: HOME | End: 2023-02-10
Payer: COMMERCIAL

## 2023-02-07 ENCOUNTER — APPOINTMENT (OUTPATIENT)
Dept: CARDIAC REHAB | Age: 58
End: 2023-02-07
Payer: COMMERCIAL

## 2023-02-07 VITALS — BODY MASS INDEX: 31.83 KG/M2 | WEIGHT: 174 LBS

## 2023-02-07 PROCEDURE — 93798 PHYS/QHP OP CAR RHAB W/ECG: CPT

## 2023-02-07 ASSESSMENT — EXERCISE STRESS TEST
PEAK_BP: 136/82
PEAK_HR: 119
PEAK_METS: 2.8

## 2023-02-09 ENCOUNTER — APPOINTMENT (OUTPATIENT)
Dept: CARDIAC REHAB | Age: 58
End: 2023-02-09
Payer: COMMERCIAL

## 2023-02-09 ENCOUNTER — CARE COORDINATION (OUTPATIENT)
Dept: CARE COORDINATION | Facility: CLINIC | Age: 58
End: 2023-02-09

## 2023-02-09 ENCOUNTER — HOSPITAL ENCOUNTER (OUTPATIENT)
Dept: CARDIAC REHAB | Age: 58
Setting detail: RECURRING SERIES
End: 2023-02-09
Payer: COMMERCIAL

## 2023-02-09 NOTE — CARE COORDINATION
Patient has graduated from the Care Transitions program on 02/09/2023. Patient/family has the ability to self-manage at this time. Patient has no further care management needs, no referral to the [facility] team for further management. Patient has Care Transition Nurse's contact information for any further questions, concerns, or needs. Arlene Lisa [address] [phone].   Patients upcoming visits:    Future Appointments   Date Time Provider Krystina Yang   2/14/2023 10:00 AM Ronald Pelayo RN [facility]   2/16/2023 10:00 AM Ronald Pelayo RN SFOCPRHB [facility]   2/17/2023 10:00 AM Taj Pedroza MD [facility]CD[facility]   2/21/2023 10:00 AM Ronald Pelayo RN [facility]   2/23/2023  9:00 AM LUIS ALBERTO Slaughter SF[facility]PRHB [facility]   2/23/2023 10:00 AM Ronald Pelayo RN [facility]   2/28/2023 10:00 AM Ronald Pelayo RN [facility]   3/2/2023 10:00 AM Ronald Pelayo RN [facility]   3/7/2023 10:00 AM Ronald Pelayo RN [facility]   3/9/2023 10:00 AM Ronald Pelaoy RN [facility]   3/14/2023 10:00 AM Ronald Pelayo RN [facility]   3/16/2023 10:00 AM Ronald Pelayo RN [facility]   3/21/2023 10:00 AM Ronald Pelayo RN [facility]   3/23/2023 10:00 AM Ronald Pelayo RN [facility]   3/28/2023 10:00 AM Ronald Pelayo RN [facility]   3/30/2023 10:00 AM Ronald Pelayo RN [facility]   4/4/2023 10:00 AM Ronald Pelayo RN [facility]   4/6/2023 10:00 AM Ronald Pelayo RN [facility]   4/11/2023 10:00 AM Ronald Pelayo RN [facility]   4/13/2023 10:00 AM Ronald Pelayo RN [facility]   4/18/2023 10:00 AM Ronald Pelayo RN [facility]   4/20/2023 10:00 AM Ronald Pelayo RN [facility]   4/25/2023 10:00 AM Ronald Pelayo RN [facility]   4/27/2023 10:00 AM Ronald Pelayo RN [facility]   5/2/2023 10:00 AM Ronald Pelayo RN SFOCPRALISSA [facility]   5/4/2023  8:45 AM SFE LAB DS [facility] E   5/4/2023 10:00 AM ANNELIESE KnightOCVALERI [facility]   5/9/2023 10:00 AM Kalie Posey RN [facility]   5/11/2023  9:40 AM MD GERMAN [facility]   5/11/2023 10:00 AM Kalie Posey RN [facility]   5/16/2023 10:00 AM Kalie Posey RN [facility]   5/18/2023 10:00 AM Kalie Posey RN [facility]   5/23/2023 10:00 AM Kalie Posey RN [facility]   5/25/2023 10:00 AM Kalie Posey RN [facility]   5/30/2023 10:00 AM Kalie Posey RN [facility]   6/1/2023 10:00 AM Kalie Posey RN [facility]

## 2023-02-14 ENCOUNTER — APPOINTMENT (OUTPATIENT)
Dept: CARDIAC REHAB | Age: 58
End: 2023-02-14
Payer: COMMERCIAL

## 2023-02-14 ENCOUNTER — HOSPITAL ENCOUNTER (OUTPATIENT)
Dept: CARDIAC REHAB | Age: 58
Setting detail: RECURRING SERIES
Discharge: HOME | End: 2023-02-17
Payer: COMMERCIAL

## 2023-02-14 VITALS — BODY MASS INDEX: 31.9 KG/M2 | WEIGHT: 174.4 LBS

## 2023-02-14 PROCEDURE — 93798 PHYS/QHP OP CAR RHAB W/ECG: CPT

## 2023-02-14 ASSESSMENT — EXERCISE STRESS TEST
PEAK_BP: 142/70
PEAK_METS: 2.9
PEAK_HR: 136

## 2023-02-16 ENCOUNTER — HOSPITAL ENCOUNTER (OUTPATIENT)
Dept: CARDIAC REHAB | Age: 58
Setting detail: RECURRING SERIES
Discharge: HOME | End: 2023-02-19
Payer: COMMERCIAL

## 2023-02-16 ENCOUNTER — APPOINTMENT (OUTPATIENT)
Dept: CARDIAC REHAB | Age: 58
End: 2023-02-16
Payer: COMMERCIAL

## 2023-02-16 PROCEDURE — 93798 PHYS/QHP OP CAR RHAB W/ECG: CPT

## 2023-02-16 ASSESSMENT — EXERCISE STRESS TEST
PEAK_HR: 132
PEAK_METS: 3.1
PEAK_BP: 130/80

## 2023-02-17 ENCOUNTER — OFFICE VISIT (OUTPATIENT)
Dept: CARDIOLOGY CLINIC | Age: 58
End: 2023-02-17
Payer: COMMERCIAL

## 2023-02-17 VITALS
WEIGHT: 177.6 LBS | HEIGHT: 62 IN | SYSTOLIC BLOOD PRESSURE: 116 MMHG | HEART RATE: 68 BPM | BODY MASS INDEX: 32.68 KG/M2 | DIASTOLIC BLOOD PRESSURE: 70 MMHG

## 2023-02-17 DIAGNOSIS — I10 ESSENTIAL HYPERTENSION: ICD-10-CM

## 2023-02-17 DIAGNOSIS — I25.118 CORONARY ARTERY DISEASE OF NATIVE ARTERY OF NATIVE HEART WITH STABLE ANGINA PECTORIS (HCC): Primary | Chronic | ICD-10-CM

## 2023-02-17 DIAGNOSIS — E78.5 HYPERLIPIDEMIA: ICD-10-CM

## 2023-02-17 DIAGNOSIS — E78.2 MIXED HYPERLIPIDEMIA: ICD-10-CM

## 2023-02-17 PROBLEM — I16.1 HYPERTENSIVE EMERGENCY: Status: RESOLVED | Noted: 2023-01-09 | Resolved: 2023-02-17

## 2023-02-17 PROCEDURE — 3078F DIAST BP <80 MM HG: CPT | Performed by: INTERNAL MEDICINE

## 2023-02-17 PROCEDURE — 99214 OFFICE O/P EST MOD 30 MIN: CPT | Performed by: INTERNAL MEDICINE

## 2023-02-17 PROCEDURE — 3074F SYST BP LT 130 MM HG: CPT | Performed by: INTERNAL MEDICINE

## 2023-02-17 RX ORDER — CETIRIZINE HYDROCHLORIDE 10 MG/1
10 TABLET ORAL DAILY
COMMUNITY

## 2023-02-17 RX ORDER — AMLODIPINE BESYLATE 5 MG/1
5 TABLET ORAL DAILY
Qty: 60 TABLET | Refills: 0
Start: 2023-02-17

## 2023-02-17 ASSESSMENT — ENCOUNTER SYMPTOMS
SHORTNESS OF BREATH: 0
COUGH: 0
BACK PAIN: 0
ABDOMINAL PAIN: 0

## 2023-02-17 NOTE — PROGRESS NOTES
[facility] CARDIOLOGY  [address], [address]  Myesha Castillo, [address] [facility]      23      NAME:  Eleonora Hernandez  : 1965  MRN: [id number]      SUBJECTIVE:   Eleonora Hernandez is a 59 y.o. female seen for a follow up visit regarding the following:     Chief Complaint   Patient presents with    Coronary Artery Disease    Hyperlipidemia    Hypertension         HPI:   59 y.o. female with strong family history of coronary artery disease. Was admitted 2023 with chest pain and severe hypertension. Cardiac catheterization demonstrated ruptured plaque in the distal right coronary artery status post PCI and stenting extending into the posterolateral branch jailing the PDA. Echocardiogram demonstrated normal left ventricular systolic function and no significant valvular heart disease. Patient tolerated the procedure well. She presents today for routine follow-up. She denies chest pain. She is compliant with medications. Past Medical History, Past Surgical History, Family history, Social History, and Medications were all reviewed with the patient today and updated as necessary. Current Outpatient Medications   Medication Sig Dispense Refill    cetirizine (ZYRTEC) 10 MG tablet Take 10 mg by mouth daily      amLODIPine (NORVASC) 5 MG tablet Take 1 tablet by mouth daily 60 tablet 0    aspirin 81 MG chewable tablet Take 1 tablet by mouth daily 90 tablet 3    busPIRone (BUSPAR) 7.5 MG tablet Take 1 tablet by mouth 2 times daily as needed (anxiety) 60 tablet 5    losartan (COZAAR) 50 MG tablet Take 1 tablet by mouth in the morning and 1 tablet in the evening.  60 tablet 0    rosuvastatin (CRESTOR) 20 MG tablet Take 1 tablet by mouth nightly 30 tablet 0    ticagrelor (BRILINTA) 90 MG TABS tablet Take 1 tablet by mouth 2 times daily 60 tablet 0    estradiol (VIVELLE) 0.1 MG/24HR Place 1 patch onto the skin Twice a Week 8 patch 3     No current facility-administered medications for this visit. Patient Active Problem List    Diagnosis Date Noted    Elevation of cardiac enzymes 2023     Priority: High    Leukocytosis 01/10/2023     Priority: Medium    Coronary artery disease of native artery of native heart with stable angina pectoris ([address] 01/10/2023     Priority: Medium     2023: 3.0 x 26 Borger drug-eluting stent postdilated to 3.5 mm      Family history of neurofibromatosis 2022     Priority: Medium    Claustrophobia 2022     Priority: Medium    Post-menopausal 2021     Priority: Low    Establishing care with new doctor, encounter for 2021     Priority: Low    Essential hypertension 2017     Priority: Low    Vertigo 2017     Priority: Low    Hyperlipidemia 2015     Priority: Low      Family History   Problem Relation Age of Onset    Elevated Lipids Mother     Hypertension Mother     Coronary Art Dis Mother     Coronary Art Dis Father         premature      Social History     Tobacco Use    Smoking status: Former     Packs/day: 0.25     Years: 8.00     Pack years: 2.00     Types: Cigarettes     Start date: 1983     Quit date: 1991     Years since quittin.6    Smokeless tobacco: Never   Substance Use Topics    Alcohol use: No       Review Of Symptoms    Review of Systems   Constitutional: Negative for fever and malaise/fatigue. HENT:  Negative for nosebleeds. Cardiovascular:  Negative for chest pain, dyspnea on exertion and palpitations. Respiratory:  Negative for cough and shortness of breath. Musculoskeletal:  Negative for back pain, muscle cramps, muscle weakness and myalgias. Gastrointestinal:  Negative for abdominal pain. Neurological:  Negative for dizziness. Physical Exam  Blood pressure 116/70, pulse 68, height 5' 2\" (1.575 m), weight 177 lb 9.6 oz (80.6 kg). Physical Exam  HENT:      Head: Normocephalic and atraumatic. Eyes:      Extraocular Movements: Extraocular movements intact.    Cardiovascular: Rate and Rhythm: Normal rate and regular rhythm. Heart sounds: No murmur heard. Pulmonary:      Effort: Pulmonary effort is normal.      Breath sounds: Normal breath sounds. Abdominal:      Palpations: Abdomen is soft. Musculoskeletal:         General: Normal range of motion. Skin:     General: Skin is warm and dry. Neurological:      General: No focal deficit present. Mental Status: She is oriented to person, place, and time. Medical problems, medical history and test results were reviewed with the patient today.       Lab Results   Component Value Date    CHOL 248 (H) 01/10/2023    CHOL 214 (H) 06/21/2022    CHOL 281 (H) 06/16/2021     Lab Results   Component Value Date    TRIG 249 (H) 01/10/2023    TRIG 226 (H) 06/21/2022    TRIG 358 (H) 06/16/2021     Lab Results   Component Value Date    HDL 34 (L) 01/10/2023    HDL 34 (L) 06/21/2022    HDL 29 (L) 06/16/2021     Lab Results   Component Value Date    LDLCALC 164.2 (H) 01/10/2023    LDLCALC 134.8 (H) 06/21/2022    LDLCALC 181 (H) 06/16/2021     Lab Results   Component Value Date    LABVLDL 49.8 (H) 01/10/2023    LABVLDL 45.2 (H) 06/21/2022    VLDL 71 (H) 06/16/2021     Lab Results   Component Value Date    CHOLHDLRATIO 7.3 01/10/2023    CHOLHDLRATIO 6.3 06/21/2022        No results found for: LABA1C  No results found for: EAG     Lab Results   Component Value Date     01/22/2023    K 4.2 01/22/2023     01/22/2023    CO2 26 01/22/2023    BUN 14 01/22/2023    CREATININE 1.00 01/22/2023    GLUCOSE 112 (H) 01/22/2023    CALCIUM 9.4 01/22/2023    PROT 8.1 01/22/2023    LABALBU 4.1 01/22/2023    BILITOT 0.4 01/22/2023    ALKPHOS 94 01/22/2023    AST 16 01/22/2023    ALT 29 01/22/2023    LABGLOM >60 01/22/2023    GFRAA >60 06/21/2022    AGRATIO 1.9 06/16/2021    GLOB 4.0 01/22/2023       Lab Results   Component Value Date/Time     01/22/2023 05:52 PM    K 4.2 01/22/2023 05:52 PM     01/22/2023 05:52 PM    CO2 26 01/22/2023 05:52 PM    BUN 14 01/22/2023 05:52 PM    CREATININE 1.00 01/22/2023 05:52 PM    GLUCOSE 112 01/22/2023 05:52 PM    CALCIUM 9.4 01/22/2023 05:52 PM        Lab Results   Component Value Date    WBC 11.4 (H) 01/22/2023    HGB 14.5 01/22/2023    HCT 45.3 01/22/2023    MCV 92.3 01/22/2023     01/22/2023             ASSESSMENT:    Diagnoses and all orders for this visit:    Coronary artery disease of native artery of native heart with stable angina pectoris [facility]) -patient is doing well status post PCI of the right coronary artery 01/2023. She is tolerating aspirin and Brilinta. Blood pressure is well controlled on losartan and amlodipine. She is tolerating moderate dose intensity statin therapy. She is currently participating in cardiac rehab and denies any angina. Mixed hyperlipidemia -continue rosuvastatin 20 mg daily. Recommend checking lipids in 3 to 6 months. Essential hypertension -blood pressure is well controlled. We will reduce amlodipine to 5 mg once daily. Patient will monitor blood pressure closely. If blood pressure remains well controlled she can reduce losartan to 50 mg once daily. Problem List Items Addressed This Visit          Circulatory    Coronary artery disease of native artery of native heart with stable angina pectoris (HCC) - Primary (Chronic)    Relevant Medications    amLODIPine (NORVASC) 5 MG tablet    Essential hypertension    Relevant Medications    amLODIPine (NORVASC) 5 MG tablet       Other    Hyperlipidemia    Relevant Medications    amLODIPine (NORVASC) 5 MG tablet       Medications Discontinued During This Encounter   Medication Reason    amLODIPine (NORVASC) 5 MG tablet                    Patient has been instructed and agrees to call our office with any issues or other concerns related to their cardiac condition(s) and/or complaint(s). Return in about 6 months (around 8/17/2023).        Swetha García MD  2/17/2023

## 2023-02-20 RX ORDER — ROSUVASTATIN CALCIUM 20 MG/1
20 TABLET, COATED ORAL NIGHTLY
Qty: 90 TABLET | Refills: 3 | Status: SHIPPED | OUTPATIENT
Start: 2023-02-20

## 2023-02-20 RX ORDER — AMLODIPINE BESYLATE 5 MG/1
5 TABLET ORAL DAILY
Qty: 90 TABLET | Refills: 3 | Status: SHIPPED | OUTPATIENT
Start: 2023-02-20

## 2023-02-20 RX ORDER — LOSARTAN POTASSIUM 50 MG/1
50 TABLET ORAL EVERY 12 HOURS
Qty: 180 TABLET | Refills: 3 | Status: SHIPPED | OUTPATIENT
Start: 2023-02-20

## 2023-02-21 ENCOUNTER — APPOINTMENT (OUTPATIENT)
Dept: CARDIAC REHAB | Age: 58
End: 2023-02-21
Payer: COMMERCIAL

## 2023-02-21 ENCOUNTER — HOSPITAL ENCOUNTER (OUTPATIENT)
Dept: CARDIAC REHAB | Age: 58
Setting detail: RECURRING SERIES
Discharge: HOME | End: 2023-02-24
Payer: COMMERCIAL

## 2023-02-21 VITALS — BODY MASS INDEX: 31.75 KG/M2 | WEIGHT: 173.6 LBS

## 2023-02-21 PROCEDURE — 93798 PHYS/QHP OP CAR RHAB W/ECG: CPT

## 2023-02-21 ASSESSMENT — EXERCISE STRESS TEST
PEAK_BP: 146/80
PEAK_HR: 126
PEAK_METS: 3.2

## 2023-02-23 ENCOUNTER — HOSPITAL ENCOUNTER (OUTPATIENT)
Dept: CARDIAC REHAB | Age: 58
Setting detail: RECURRING SERIES
Discharge: HOME | End: 2023-02-26
Payer: COMMERCIAL

## 2023-02-23 ENCOUNTER — APPOINTMENT (OUTPATIENT)
Dept: CARDIAC REHAB | Age: 58
End: 2023-02-23
Payer: COMMERCIAL

## 2023-02-23 PROCEDURE — 93798 PHYS/QHP OP CAR RHAB W/ECG: CPT

## 2023-02-23 ASSESSMENT — EXERCISE STRESS TEST
PEAK_METS: 3.2
PEAK_HR: 124

## 2023-02-23 NOTE — PROGRESS NOTES
59year old female s/p percutaneous coronary angioplasty enrolled in cardiac rehabilitation, seen today for initial nutrition counseling. Patient stated goals: Return to an 80% plant-based diet      NUTRITION ASSESSMENT   Anthropometrics:   Ht:5'2\"  Wt:174 lb   BMI:31.75  BMI class of Obese Class 1  based on age below 72  Waist measurement (inches): 36.5    Medical History: CAD, hyperlipidemia, HTN     Nutrition related medications/supplements: Losartan, Ticagrelor      Nutrition Related Labs:   (1/10/23)  TC: 248   TRI  HDL: 34  LDL: 164.2       Nutrition/Diet History:     Food Preferences: Follows a vegan diet. Does occasionally consume animal products like burgers, hot dogs, butter. Mostly cooks meals at home, but does enjoy going out to [employer], or [employer] with her farther. NFPE: no muscle or fat loss observed. Lifestyle, Culture,Support System: Pt lives alone. Physical Activity: Rehabilitation 3 days per week. Barriers: none identified    Stage of Behavior Change: Action    Estimated Nutritional Needs:  Calories: MSJ x 1.3: 1814  Protein: (25% of estimated energy needs) 113  CHO: (45% of estimated energy needs) 204    NUTRITION DIAGNOSIS  No nutrition diagnosis at this time. NUTRITION INTERVENTION  Recommendations:  Include vegetables, fruits, whole grains, nuts/seeds, beans/legumes in all meals. Less than 13 grams of saturated fat and avoid trans fat daily. Include unsaturated fat sources (nuts, seeds, avocado). Cardioprotective Nutrition Education and Counseling   Educated patient on cardioprotective meal pattern/Mediterranean meal pattern including  Guidelines for saturated fat (<7% total calories), sodium (<2000mg/day or follow MD recommendations), and added sugars and high sources of each reviewed  Consumption of daily fiber intake with emphasis on 7-13 grams of soluble fiber daily and food sources reviewed.   Emphasis and sources of unsaturated fats and specific benefits of omega-3 fatty acids reviewed for cardioprotective benefits. Emphasis on cardioprotective benefits of daily intake of plant-focused eating pattern. Utilized MI strategies to guide and support changes in food behaviors. Nutrition Goals: To improve diet quality, by decreasing intake of added sugars, sodium, and increasing intake of f/v by end of cardiopulmonary rehabilitation. NUTRITION MONITORING/EVALUATION  RD to follow up with participant during rehab sessions for questions and assessment of progression toward goals. Anticipate Good  compliance with recommendations. Pt verbalizes understanding to recommendations discussed.      Flora Barajas, JOANN  Cardiopulmonary Rehab Dietitian

## 2023-02-28 ENCOUNTER — APPOINTMENT (OUTPATIENT)
Dept: CARDIAC REHAB | Age: 58
End: 2023-02-28
Payer: COMMERCIAL

## 2023-02-28 ENCOUNTER — HOSPITAL ENCOUNTER (OUTPATIENT)
Dept: CARDIAC REHAB | Age: 58
Setting detail: RECURRING SERIES
Discharge: HOME | End: 2023-03-03
Payer: COMMERCIAL

## 2023-02-28 VITALS — BODY MASS INDEX: 31.68 KG/M2 | WEIGHT: 173.2 LBS

## 2023-02-28 PROCEDURE — 93798 PHYS/QHP OP CAR RHAB W/ECG: CPT

## 2023-02-28 ASSESSMENT — LIFESTYLE VARIABLES
SMOKELESS_TOBACCO: NO
CIGARETTES_PER_DAY: 0.25PPD

## 2023-02-28 ASSESSMENT — EJECTION FRACTION: EF_VALUE: 55

## 2023-02-28 ASSESSMENT — EXERCISE STRESS TEST
PEAK_METS: 3.2
PEAK_BP: 128/76
PEAK_HR: 122
PEAK_BP: 128/76

## 2023-03-02 ENCOUNTER — HOSPITAL ENCOUNTER (OUTPATIENT)
Dept: CARDIAC REHAB | Age: 58
Setting detail: RECURRING SERIES
Discharge: HOME | End: 2023-03-05
Payer: COMMERCIAL

## 2023-03-02 ENCOUNTER — APPOINTMENT (OUTPATIENT)
Dept: CARDIAC REHAB | Age: 58
End: 2023-03-02
Payer: COMMERCIAL

## 2023-03-02 PROCEDURE — 93798 PHYS/QHP OP CAR RHAB W/ECG: CPT

## 2023-03-02 ASSESSMENT — EXERCISE STRESS TEST
PEAK_METS: 3.2
PEAK_HR: 111

## 2023-03-07 ENCOUNTER — APPOINTMENT (OUTPATIENT)
Dept: CARDIAC REHAB | Age: 58
End: 2023-03-07
Payer: COMMERCIAL

## 2023-03-07 ENCOUNTER — HOSPITAL ENCOUNTER (OUTPATIENT)
Dept: CARDIAC REHAB | Age: 58
Setting detail: RECURRING SERIES
Discharge: HOME | End: 2023-03-10
Payer: COMMERCIAL

## 2023-03-07 VITALS — WEIGHT: 173.6 LBS | BODY MASS INDEX: 31.75 KG/M2

## 2023-03-07 PROCEDURE — 93798 PHYS/QHP OP CAR RHAB W/ECG: CPT

## 2023-03-07 ASSESSMENT — EXERCISE STRESS TEST
PEAK_METS: 3.2
PEAK_HR: 121
PEAK_BP: 168/80

## 2023-03-09 ENCOUNTER — APPOINTMENT (OUTPATIENT)
Dept: CARDIAC REHAB | Age: 58
End: 2023-03-09
Payer: COMMERCIAL

## 2023-03-14 ENCOUNTER — APPOINTMENT (OUTPATIENT)
Dept: CARDIAC REHAB | Age: 58
End: 2023-03-14
Payer: COMMERCIAL

## 2023-03-16 ENCOUNTER — HOSPITAL ENCOUNTER (OUTPATIENT)
Dept: CARDIAC REHAB | Age: 58
Setting detail: RECURRING SERIES
Discharge: HOME | End: 2023-03-19
Payer: COMMERCIAL

## 2023-03-16 ENCOUNTER — APPOINTMENT (OUTPATIENT)
Dept: CARDIAC REHAB | Age: 58
End: 2023-03-16
Payer: COMMERCIAL

## 2023-03-16 VITALS — WEIGHT: 172.2 LBS | BODY MASS INDEX: 31.5 KG/M2

## 2023-03-16 PROCEDURE — 93798 PHYS/QHP OP CAR RHAB W/ECG: CPT

## 2023-03-16 ASSESSMENT — EXERCISE STRESS TEST
PEAK_HR: 133
PEAK_METS: 3.3

## 2023-03-21 ENCOUNTER — APPOINTMENT (OUTPATIENT)
Dept: CARDIAC REHAB | Age: 58
End: 2023-03-21
Payer: COMMERCIAL

## 2023-03-21 ENCOUNTER — HOSPITAL ENCOUNTER (OUTPATIENT)
Dept: CARDIAC REHAB | Age: 58
Setting detail: RECURRING SERIES
Discharge: HOME | End: 2023-03-24
Payer: COMMERCIAL

## 2023-03-21 VITALS — BODY MASS INDEX: 31.53 KG/M2 | WEIGHT: 172.4 LBS

## 2023-03-21 PROCEDURE — 93798 PHYS/QHP OP CAR RHAB W/ECG: CPT

## 2023-03-21 ASSESSMENT — EXERCISE STRESS TEST
PEAK_HR: 133
PEAK_BP: 130/78
PEAK_METS: 3.7

## 2023-03-23 ENCOUNTER — HOSPITAL ENCOUNTER (OUTPATIENT)
Dept: CARDIAC REHAB | Age: 58
Setting detail: RECURRING SERIES
Discharge: HOME | End: 2023-03-26
Payer: COMMERCIAL

## 2023-03-23 ENCOUNTER — APPOINTMENT (OUTPATIENT)
Dept: CARDIAC REHAB | Age: 58
End: 2023-03-23
Payer: COMMERCIAL

## 2023-03-23 PROCEDURE — 93798 PHYS/QHP OP CAR RHAB W/ECG: CPT

## 2023-03-23 ASSESSMENT — EXERCISE STRESS TEST
PEAK_METS: 3.7
PEAK_HR: 125

## 2023-03-28 ENCOUNTER — APPOINTMENT (OUTPATIENT)
Dept: CARDIAC REHAB | Age: 58
End: 2023-03-28
Payer: COMMERCIAL

## 2023-03-30 ENCOUNTER — HOSPITAL ENCOUNTER (OUTPATIENT)
Dept: CARDIAC REHAB | Age: 58
Setting detail: RECURRING SERIES
End: 2023-03-30
Payer: COMMERCIAL

## 2023-03-30 ENCOUNTER — APPOINTMENT (OUTPATIENT)
Dept: CARDIAC REHAB | Age: 58
End: 2023-03-30
Payer: COMMERCIAL

## 2023-03-30 ASSESSMENT — EJECTION FRACTION: EF_VALUE: 55

## 2023-03-30 ASSESSMENT — LIFESTYLE VARIABLES
CIGARETTES_PER_DAY: 0.25PPD
SMOKELESS_TOBACCO: NO

## 2023-03-30 ASSESSMENT — EXERCISE STRESS TEST: PEAK_BP: 130/78

## 2023-05-04 ENCOUNTER — HOSPITAL ENCOUNTER (OUTPATIENT)
Dept: LAB | Age: 58
Discharge: HOME | End: 2023-05-07

## 2023-05-04 DIAGNOSIS — Z00.00 LABORATORY EXAM ORDERED AS PART OF ROUTINE GENERAL MEDICAL EXAMINATION: ICD-10-CM

## 2023-05-04 DIAGNOSIS — F41.8 ANXIETY ABOUT HEALTH: ICD-10-CM

## 2023-05-04 DIAGNOSIS — I10 ESSENTIAL HYPERTENSION: ICD-10-CM

## 2023-05-04 DIAGNOSIS — I24.8 DEMAND ISCHEMIA (HCC): ICD-10-CM

## 2023-05-04 DIAGNOSIS — E78.2 MIXED HYPERLIPIDEMIA: ICD-10-CM

## 2023-05-04 DIAGNOSIS — Z78.0 POST-MENOPAUSAL: ICD-10-CM

## 2023-05-04 LAB
ALT SERPL-CCNC: 25 U/L (ref 12–65)
ANION GAP SERPL CALC-SCNC: 1 MMOL/L (ref 2–11)
AST SERPL-CCNC: 12 U/L (ref 15–37)
BUN SERPL-MCNC: 14 MG/DL (ref 6–23)
CALCIUM SERPL-MCNC: 9.2 MG/DL (ref 8.3–10.4)
CHLORIDE SERPL-SCNC: 108 MMOL/L (ref 101–110)
CHOLEST SERPL-MCNC: 117 MG/DL
CO2 SERPL-SCNC: 28 MMOL/L (ref 21–32)
CREAT SERPL-MCNC: 0.8 MG/DL (ref 0.6–1)
ERYTHROCYTE [DISTWIDTH] IN BLOOD BY AUTOMATED COUNT: 11.9 % (ref 11.9–14.6)
GLUCOSE SERPL-MCNC: 100 MG/DL (ref 65–100)
HCT VFR BLD AUTO: 41.2 % (ref 35.8–46.3)
HDLC SERPL-MCNC: 39 MG/DL (ref 40–60)
HDLC SERPL: 3
HGB BLD-MCNC: 13.2 G/DL (ref 11.7–15.4)
LDLC SERPL CALC-MCNC: 51.2 MG/DL
MCH RBC QN AUTO: 30.2 PG (ref 26.1–32.9)
MCHC RBC AUTO-ENTMCNC: 32 G/DL (ref 31.4–35)
MCV RBC AUTO: 94.3 FL (ref 82–102)
NRBC # BLD: 0 K/UL (ref 0–0.2)
PLATELET # BLD AUTO: 301 K/UL (ref 150–450)
PMV BLD AUTO: 11.2 FL (ref 9.4–12.3)
POTASSIUM SERPL-SCNC: 4.4 MMOL/L (ref 3.5–5.1)
RBC # BLD AUTO: 4.37 M/UL (ref 4.05–5.2)
SODIUM SERPL-SCNC: 137 MMOL/L (ref 133–143)
TRIGL SERPL-MCNC: 134 MG/DL (ref 35–150)
TSH, 3RD GENERATION: 1.63 UIU/ML (ref 0.36–3.74)
VLDLC SERPL CALC-MCNC: 26.8 MG/DL (ref 6–23)
WBC # BLD AUTO: 6 K/UL (ref 4.3–11.1)

## 2023-05-08 SDOH — ECONOMIC STABILITY: INCOME INSECURITY: HOW HARD IS IT FOR YOU TO PAY FOR THE VERY BASICS LIKE FOOD, HOUSING, MEDICAL CARE, AND HEATING?: NOT HARD AT ALL

## 2023-05-08 SDOH — ECONOMIC STABILITY: HOUSING INSECURITY
IN THE LAST 12 MONTHS, WAS THERE A TIME WHEN YOU DID NOT HAVE A STEADY PLACE TO SLEEP OR SLEPT IN A SHELTER (INCLUDING NOW)?: NO

## 2023-05-08 SDOH — ECONOMIC STABILITY: FOOD INSECURITY: WITHIN THE PAST 12 MONTHS, YOU WORRIED THAT YOUR FOOD WOULD RUN OUT BEFORE YOU GOT MONEY TO BUY MORE.: NEVER TRUE

## 2023-05-08 SDOH — ECONOMIC STABILITY: FOOD INSECURITY: WITHIN THE PAST 12 MONTHS, THE FOOD YOU BOUGHT JUST DIDN'T LAST AND YOU DIDN'T HAVE MONEY TO GET MORE.: NEVER TRUE

## 2023-05-08 SDOH — ECONOMIC STABILITY: TRANSPORTATION INSECURITY
IN THE PAST 12 MONTHS, HAS LACK OF TRANSPORTATION KEPT YOU FROM MEETINGS, WORK, OR FROM GETTING THINGS NEEDED FOR DAILY LIVING?: NO

## 2023-05-11 ENCOUNTER — OFFICE VISIT (OUTPATIENT)
Dept: INTERNAL MEDICINE CLINIC | Facility: CLINIC | Age: 58
End: 2023-05-11
Payer: COMMERCIAL

## 2023-05-11 VITALS
TEMPERATURE: 97.1 F | HEART RATE: 68 BPM | DIASTOLIC BLOOD PRESSURE: 82 MMHG | HEIGHT: 62 IN | SYSTOLIC BLOOD PRESSURE: 134 MMHG | WEIGHT: 177 LBS | BODY MASS INDEX: 32.57 KG/M2 | OXYGEN SATURATION: 98 %

## 2023-05-11 DIAGNOSIS — I25.118 CORONARY ARTERY DISEASE OF NATIVE ARTERY OF NATIVE HEART WITH STABLE ANGINA PECTORIS (HCC): Chronic | ICD-10-CM

## 2023-05-11 DIAGNOSIS — I24.8 DEMAND ISCHEMIA (HCC): ICD-10-CM

## 2023-05-11 DIAGNOSIS — S46.012A STRAIN OF LEFT ROTATOR CUFF CAPSULE, INITIAL ENCOUNTER: ICD-10-CM

## 2023-05-11 DIAGNOSIS — F41.8 ANXIETY ABOUT HEALTH: ICD-10-CM

## 2023-05-11 DIAGNOSIS — Z79.899 ON STATIN THERAPY: Primary | ICD-10-CM

## 2023-05-11 PROCEDURE — 3079F DIAST BP 80-89 MM HG: CPT | Performed by: INTERNAL MEDICINE

## 2023-05-11 PROCEDURE — 3075F SYST BP GE 130 - 139MM HG: CPT | Performed by: INTERNAL MEDICINE

## 2023-05-11 PROCEDURE — 99213 OFFICE O/P EST LOW 20 MIN: CPT | Performed by: INTERNAL MEDICINE

## 2023-05-11 RX ORDER — ESTRADIOL 0.1 MG/D
1 FILM, EXTENDED RELEASE TRANSDERMAL
Qty: 8 PATCH | Refills: 3 | Status: SHIPPED | OUTPATIENT
Start: 2023-05-11

## 2023-05-11 RX ORDER — FLUTICASONE PROPIONATE 50 MCG
SPRAY, SUSPENSION (ML) NASAL
COMMUNITY
Start: 2023-04-18

## 2023-05-11 RX ORDER — ASPIRIN 81 MG/1
81 TABLET, CHEWABLE ORAL DAILY
Qty: 90 TABLET | Refills: 3 | Status: CANCELLED | OUTPATIENT
Start: 2023-05-11

## 2023-05-11 RX ORDER — BUSPIRONE HYDROCHLORIDE 7.5 MG/1
7.5 TABLET ORAL 2 TIMES DAILY PRN
Qty: 60 TABLET | Refills: 5 | Status: CANCELLED | OUTPATIENT
Start: 2023-05-11 | End: 2023-11-07

## 2023-05-11 ASSESSMENT — ENCOUNTER SYMPTOMS
CHEST TIGHTNESS: 0
SHORTNESS OF BREATH: 0

## 2023-05-11 NOTE — PROGRESS NOTES
ASSESSMENT/PLAN:    Emphasized to take aspirin and Brilinta as prescribed. Continue diet and exercise regimens and cardiac rehab. Emphasized to take Crestor as prescribed. BuSpar 7.5 mg twice daily as needed for anxiety due to health crisis. If worsens, consider SSRI. Encouraged healthy diet. A healthy diet to consider is a more mediterranean diet which is abundant in fruits, vegetables, whole grains, legumes and olive oil. It features fish and poultry, lean sources of protein over red meat. The importance of a healthy lifestyle are discussed. Limit high fructose containing foods, moderate portion sizes,  regular cardiovascular exercise (walking, swimming, aerobics) for 30-45 minutes, 3-4 times weekly. Cardiac cath notes and procedure reviewed. Right radial cath site looks great. Patient Instructions   Muscle enzyme lab order, standing order for CK. Proceed at anytime if feel not tolerating statin. Future lab orders for six months. PT exercises for shoulder. Medication refilled          Evaluation and management of the chronic condition(s) delineated. No negative side effects reported. I have reviewed all the lab results. There are some abnormalities that are either expected or not critical to the patient's health, and are discussed in the office today and are addressed. Please refer to the above assessement and plan narrative and orders and follow up plan. Medication discussed and refilled as needed. Physical exam findings are stable unless otherwise indicated and this is addressed. The most recent lab work and imaging and consultant/urgent care visits and imaging are reviewed and discussed and considered during this visit encounter. 1. On statin therapy  -     CBC; Future  -     Comprehensive Metabolic Panel; Future  -     Lipid Panel; Future  -     Hemoglobin A1C; Future  -     TSH; Future  -     CK; Future  2. Anxiety about health  -     CBC;  Future  -

## 2023-05-11 NOTE — PATIENT INSTRUCTIONS
Muscle enzyme lab order, standing order for CK. Proceed at anytime if feel not tolerating statin. Future lab orders for six months. PT exercises for shoulder.       Medication refilled

## 2023-08-21 ENCOUNTER — OFFICE VISIT (OUTPATIENT)
Age: 58
End: 2023-08-21
Payer: COMMERCIAL

## 2023-08-21 VITALS
BODY MASS INDEX: 33.38 KG/M2 | SYSTOLIC BLOOD PRESSURE: 118 MMHG | WEIGHT: 181.4 LBS | HEIGHT: 62 IN | DIASTOLIC BLOOD PRESSURE: 64 MMHG | HEART RATE: 60 BPM

## 2023-08-21 DIAGNOSIS — I10 ESSENTIAL HYPERTENSION: ICD-10-CM

## 2023-08-21 DIAGNOSIS — E78.00 PURE HYPERCHOLESTEROLEMIA: ICD-10-CM

## 2023-08-21 DIAGNOSIS — I25.118 CORONARY ARTERY DISEASE OF NATIVE ARTERY OF NATIVE HEART WITH STABLE ANGINA PECTORIS (HCC): Primary | Chronic | ICD-10-CM

## 2023-08-21 PROCEDURE — 99214 OFFICE O/P EST MOD 30 MIN: CPT | Performed by: INTERNAL MEDICINE

## 2023-08-21 PROCEDURE — 3078F DIAST BP <80 MM HG: CPT | Performed by: INTERNAL MEDICINE

## 2023-08-21 PROCEDURE — 3074F SYST BP LT 130 MM HG: CPT | Performed by: INTERNAL MEDICINE

## 2023-08-21 ASSESSMENT — ENCOUNTER SYMPTOMS
ABDOMINAL PAIN: 0
BACK PAIN: 0
COUGH: 0
SHORTNESS OF BREATH: 0

## 2023-08-21 NOTE — PROGRESS NOTES
[facility] CARDIOLOGY  [address] [address]      23      NAME:  Tamika Villalobos  : 1965  MRN: [id number]      SUBJECTIVE:   Tamika Villalobos is a 59 y.o. female seen for a follow up visit regarding the following:     Chief Complaint   Patient presents with    Coronary Artery Disease    Hyperlipidemia    Hypertension         HPI:   59 y.o. female with strong family history of coronary artery disease. Was admitted 2023 with chest pain and severe hypertension. Cardiac catheterization demonstrated ruptured plaque in the distal right coronary artery status post PCI and stenting extending into the posterolateral branch jailing the PDA. Echocardiogram demonstrated normal left ventricular systolic function and no significant valvular heart disease. Patient tolerated the procedure well. She presents today for routine follow-up. She denies chest pain. She is compliant with medications. Past Medical History, Past Surgical History, Family history, Social History, and Medications were all reviewed with the patient today and updated as necessary. Current Outpatient Medications   Medication Sig Dispense Refill    estradiol (VIVELLE) 0.1 MG/24HR Place 1 patch onto the skin Twice a Week 8 patch 3    fluticasone (FLONASE) 50 MCG/ACT nasal spray INHALE 2 SPRAYS IN EACH NOSTRIL ONCE DAILY FOR 1 WEEK. THEN 1 TO 2 SPRAYS DAILY AS NEEDED FOR SINUS CONGESTION      losartan (COZAAR) 50 MG tablet Take 1 tablet by mouth in the morning and 1 tablet in the evening.  180 tablet 3    rosuvastatin (CRESTOR) 20 MG tablet Take 1 tablet by mouth nightly 90 tablet 3    ticagrelor (BRILINTA) 90 MG TABS tablet Take 1 tablet by mouth 2 times daily 180 tablet 3    amLODIPine (NORVASC) 5 MG tablet Take 1 tablet by mouth daily 90 tablet 3    cetirizine (ZYRTEC) 10 MG tablet Take 1 tablet by mouth daily      aspirin 81 MG chewable tablet Take 1 tablet by mouth daily 90 tablet 3

## 2023-10-30 RX ORDER — ESTRADIOL 0.1 MG/D
1 FILM, EXTENDED RELEASE TRANSDERMAL
Qty: 8 PATCH | Refills: 3 | Status: SHIPPED | OUTPATIENT
Start: 2023-10-30

## 2024-02-15 ENCOUNTER — NURSE ONLY (OUTPATIENT)
Dept: INTERNAL MEDICINE CLINIC | Facility: CLINIC | Age: 59
End: 2024-02-15

## 2024-02-15 DIAGNOSIS — F41.8 ANXIETY ABOUT HEALTH: ICD-10-CM

## 2024-02-15 DIAGNOSIS — Z79.899 ON STATIN THERAPY: ICD-10-CM

## 2024-02-15 DIAGNOSIS — I25.118 CORONARY ARTERY DISEASE OF NATIVE ARTERY OF NATIVE HEART WITH STABLE ANGINA PECTORIS (HCC): Chronic | ICD-10-CM

## 2024-02-15 DIAGNOSIS — I24.89 DEMAND ISCHEMIA: ICD-10-CM

## 2024-02-15 DIAGNOSIS — S46.012A STRAIN OF LEFT ROTATOR CUFF CAPSULE, INITIAL ENCOUNTER: ICD-10-CM

## 2024-02-15 LAB
ALBUMIN SERPL-MCNC: 4.1 G/DL (ref 3.2–4.6)
ALBUMIN/GLOB SERPL: 1.2 (ref 0.4–1.6)
ALP SERPL-CCNC: 69 U/L (ref 50–136)
ALT SERPL-CCNC: 34 U/L (ref 12–65)
ANION GAP SERPL CALC-SCNC: 5 MMOL/L (ref 2–11)
AST SERPL-CCNC: 15 U/L (ref 15–37)
BILIRUB SERPL-MCNC: 0.4 MG/DL (ref 0.2–1.1)
BUN SERPL-MCNC: 13 MG/DL (ref 8–23)
CALCIUM SERPL-MCNC: 9.1 MG/DL (ref 8.3–10.4)
CHLORIDE SERPL-SCNC: 108 MMOL/L (ref 103–113)
CHOLEST SERPL-MCNC: 116 MG/DL
CK SERPL-CCNC: 83 U/L (ref 21–215)
CO2 SERPL-SCNC: 28 MMOL/L (ref 21–32)
CREAT SERPL-MCNC: 0.8 MG/DL (ref 0.6–1)
ERYTHROCYTE [DISTWIDTH] IN BLOOD BY AUTOMATED COUNT: 11.9 % (ref 11.9–14.6)
GLOBULIN SER CALC-MCNC: 3.5 G/DL (ref 2.8–4.5)
GLUCOSE SERPL-MCNC: 102 MG/DL (ref 65–100)
HCT VFR BLD AUTO: 41.7 % (ref 35.8–46.3)
HDLC SERPL-MCNC: 37 MG/DL (ref 40–60)
HDLC SERPL: 3.1
HGB BLD-MCNC: 13.5 G/DL (ref 11.7–15.4)
LDLC SERPL CALC-MCNC: 48 MG/DL
MCH RBC QN AUTO: 30 PG (ref 26.1–32.9)
MCHC RBC AUTO-ENTMCNC: 32.4 G/DL (ref 31.4–35)
MCV RBC AUTO: 92.7 FL (ref 82–102)
NRBC # BLD: 0 K/UL (ref 0–0.2)
PLATELET # BLD AUTO: 280 K/UL (ref 150–450)
PMV BLD AUTO: 11 FL (ref 9.4–12.3)
POTASSIUM SERPL-SCNC: 3.9 MMOL/L (ref 3.5–5.1)
PROT SERPL-MCNC: 7.6 G/DL (ref 6.3–8.2)
RBC # BLD AUTO: 4.5 M/UL (ref 4.05–5.2)
SODIUM SERPL-SCNC: 141 MMOL/L (ref 136–146)
TRIGL SERPL-MCNC: 155 MG/DL (ref 35–150)
TSH, 3RD GENERATION: 1.38 UIU/ML (ref 0.36–3.74)
VLDLC SERPL CALC-MCNC: 31 MG/DL (ref 6–23)
WBC # BLD AUTO: 7.1 K/UL (ref 4.3–11.1)

## 2024-02-16 LAB
EST. AVERAGE GLUCOSE BLD GHB EST-MCNC: 111 MG/DL
HBA1C MFR BLD: 5.5 % (ref 4.8–5.6)

## 2024-02-16 NOTE — Clinical Note
Guidewire removed. [FreeTextEntry1] : 2 previous engagements, in same-sex relationship since 10/2018. Currently unemployed, Former [employer] ; Studying for bachelors in psychology and minor in women and gender studies, to graduate 2023. Mom in [place], Dad in [place]. Has 7 siblings, raised 5 siblings. Has friends. currently working as pharmacy tech. Domiciled with grandmother in [facility]. \par  \par  Description: medicinal marijuana for PTSD; 0.5 cartridge/month with daily use

## 2024-02-22 ENCOUNTER — OFFICE VISIT (OUTPATIENT)
Dept: INTERNAL MEDICINE CLINIC | Facility: CLINIC | Age: 59
End: 2024-02-22
Payer: COMMERCIAL

## 2024-02-22 VITALS
DIASTOLIC BLOOD PRESSURE: 78 MMHG | SYSTOLIC BLOOD PRESSURE: 132 MMHG | WEIGHT: 187 LBS | HEIGHT: 62 IN | OXYGEN SATURATION: 99 % | BODY MASS INDEX: 34.41 KG/M2 | HEART RATE: 61 BPM | TEMPERATURE: 97.2 F

## 2024-02-22 DIAGNOSIS — E78.2 MIXED HYPERLIPIDEMIA: Primary | ICD-10-CM

## 2024-02-22 DIAGNOSIS — I25.118 CORONARY ARTERY DISEASE OF NATIVE ARTERY OF NATIVE HEART WITH STABLE ANGINA PECTORIS (HCC): ICD-10-CM

## 2024-02-22 DIAGNOSIS — I10 ESSENTIAL HYPERTENSION: ICD-10-CM

## 2024-02-22 DIAGNOSIS — Z12.31 ENCOUNTER FOR SCREENING MAMMOGRAM FOR MALIGNANT NEOPLASM OF BREAST: ICD-10-CM

## 2024-02-22 DIAGNOSIS — I24.89 DEMAND ISCHEMIA: ICD-10-CM

## 2024-02-22 PROCEDURE — 3078F DIAST BP <80 MM HG: CPT | Performed by: INTERNAL MEDICINE

## 2024-02-22 PROCEDURE — 3075F SYST BP GE 130 - 139MM HG: CPT | Performed by: INTERNAL MEDICINE

## 2024-02-22 PROCEDURE — 99214 OFFICE O/P EST MOD 30 MIN: CPT | Performed by: INTERNAL MEDICINE

## 2024-02-22 RX ORDER — ROSUVASTATIN CALCIUM 20 MG/1
20 TABLET, COATED ORAL NIGHTLY
Qty: 90 TABLET | Refills: 3 | Status: SHIPPED | OUTPATIENT
Start: 2024-02-22

## 2024-02-22 RX ORDER — AMLODIPINE BESYLATE 5 MG/1
5 TABLET ORAL DAILY
Qty: 90 TABLET | Refills: 3 | Status: SHIPPED | OUTPATIENT
Start: 2024-02-22

## 2024-02-22 RX ORDER — LOSARTAN POTASSIUM 50 MG/1
50 TABLET ORAL EVERY 12 HOURS
Qty: 180 TABLET | Refills: 3 | Status: SHIPPED | OUTPATIENT
Start: 2024-02-22

## 2024-02-22 RX ORDER — ESTRADIOL 0.1 MG/D
1 FILM, EXTENDED RELEASE TRANSDERMAL
Qty: 8 PATCH | Refills: 3 | Status: SHIPPED | OUTPATIENT
Start: 2024-02-22

## 2024-02-22 NOTE — PATIENT INSTRUCTIONS
The medication list included in this document is our record of what you are currently taking, including any changes that were made at today's visit.  If you find any differences when compared to your medications at home, or have any questions that were not answered at your visit, please contact the office.    Follow up in six months with labs prior.  See orders.     Larry Lee MD

## 2024-02-22 NOTE — PROGRESS NOTES
Week, Disp: 8 patch, Rfl: 3    amLODIPine (NORVASC) 5 MG tablet, Take 1 tablet by mouth daily, Disp: 90 tablet, Rfl: 3    losartan (COZAAR) 50 MG tablet, Take 1 tablet by mouth in the morning and 1 tablet in the evening., Disp: 180 tablet, Rfl: 3    rosuvastatin (CRESTOR) 20 MG tablet, Take 1 tablet by mouth nightly, Disp: 90 tablet, Rfl: 3    ticagrelor (BRILINTA) 90 MG TABS tablet, Take 1 tablet by mouth 2 times daily, Disp: 180 tablet, Rfl: 3    fluticasone (FLONASE) 50 MCG/ACT nasal spray, INHALE 2 SPRAYS IN EACH NOSTRIL ONCE DAILY FOR 1 WEEK. THEN 1 TO 2 SPRAYS DAILY AS NEEDED FOR SINUS CONGESTION, Disp: , Rfl:     cetirizine (ZYRTEC) 10 MG tablet, Take 1 tablet by mouth daily, Disp: , Rfl:     aspirin 81 MG chewable tablet, Take 1 tablet by mouth daily, Disp: 90 tablet, Rfl: 3         Review of Systems    Results for orders placed or performed in visit on 02/19/24 (from the past 2160 hour(s))   Vascular duplex carotid bilateral   Result Value Ref Range    Left CCA dist PSV 96.7 cm/s    Left CCA prox PSV 94.8 cm/s    Left ICA dist PSV 93.8 cm/s    Left ICA dist EDV 20.4 cm/s    Left ICA mid PSV 97.7 cm/s    Left ICA mid EDV 27.5 cm/s    Left ICA prox PSV 99.6 cm/s    Left ICA prox EDV 25.6 cm/s    Left ECA .0 cm/s    Left vertebral PSV 35.6 cm/s    Right cca dist PSV 71.9 cm/s    Right CCA prox .0 cm/s    Right ICA dist PSV 80.4 cm/s    Right ICA dist EDV 22.1 cm/s    Right ICA mid .0 cm/s    Right ICA mid EDV 32.2 cm/s    Right ICA prox PSV 92.7 cm/s    Right ICA prox EDV 22.8 cm/s    Right ECA .0 cm/s    Right vertebral PSV 75.9 cm/s    Body Surface Area 1.9 m2    Right ICA/CCA PSV 2.00     Right arm  mmHg    Left arm  mmHg    Left ICA/CCA PSV 1.00    Results for orders placed or performed in visit on 02/15/24 (from the past 2160 hour(s))   CK   Result Value Ref Range    Total CK 83 21 - 215 U/L   TSH   Result Value Ref Range    TSH, 3RD GENERATION 1.380 0.358 - 3.740

## 2024-02-26 ENCOUNTER — OFFICE VISIT (OUTPATIENT)
Age: 59
End: 2024-02-26
Payer: COMMERCIAL

## 2024-02-26 VITALS
HEART RATE: 65 BPM | WEIGHT: 186 LBS | SYSTOLIC BLOOD PRESSURE: 144 MMHG | BODY MASS INDEX: 35.12 KG/M2 | HEIGHT: 61 IN | DIASTOLIC BLOOD PRESSURE: 98 MMHG

## 2024-02-26 DIAGNOSIS — E66.01 SEVERE OBESITY (BMI 35.0-39.9) WITH COMORBIDITY (HCC): ICD-10-CM

## 2024-02-26 DIAGNOSIS — I10 ESSENTIAL HYPERTENSION: ICD-10-CM

## 2024-02-26 DIAGNOSIS — E78.00 PURE HYPERCHOLESTEROLEMIA: ICD-10-CM

## 2024-02-26 DIAGNOSIS — I25.118 CORONARY ARTERY DISEASE OF NATIVE ARTERY OF NATIVE HEART WITH STABLE ANGINA PECTORIS (HCC): Primary | Chronic | ICD-10-CM

## 2024-02-26 PROCEDURE — 3077F SYST BP >= 140 MM HG: CPT | Performed by: INTERNAL MEDICINE

## 2024-02-26 PROCEDURE — 3080F DIAST BP >= 90 MM HG: CPT | Performed by: INTERNAL MEDICINE

## 2024-02-26 PROCEDURE — 93000 ELECTROCARDIOGRAM COMPLETE: CPT | Performed by: INTERNAL MEDICINE

## 2024-02-26 PROCEDURE — 99214 OFFICE O/P EST MOD 30 MIN: CPT | Performed by: INTERNAL MEDICINE

## 2024-02-26 RX ORDER — BUSPIRONE HYDROCHLORIDE 7.5 MG/1
7.5 TABLET ORAL PRN
COMMUNITY

## 2024-02-26 ASSESSMENT — ENCOUNTER SYMPTOMS
COUGH: 0
SHORTNESS OF BREATH: 0
ABDOMINAL PAIN: 0
BACK PAIN: 0

## 2024-02-26 NOTE — PROGRESS NOTES
AGRATIO 1.2 02/15/2024    GLOB 3.5 02/15/2024       Lab Results   Component Value Date/Time     02/15/2024 08:49 AM    K 3.9 02/15/2024 08:49 AM     02/15/2024 08:49 AM    CO2 28 02/15/2024 08:49 AM    BUN 13 02/15/2024 08:49 AM    CREATININE 0.80 02/15/2024 08:49 AM    GLUCOSE 102 02/15/2024 08:49 AM    CALCIUM 9.1 02/15/2024 08:49 AM        Lab Results   Component Value Date    WBC 7.1 02/15/2024    HGB 13.5 02/15/2024    HCT 41.7 02/15/2024    MCV 92.7 02/15/2024     02/15/2024             ASSESSMENT:    Diagnoses and all orders for this visit:    Coronary artery disease of native artery of native heart with stable angina pectoris (HCC) -patient is doing well status post PCI of the right coronary artery 01/2023.  She is tolerating aspirin and Brilinta.  Blood pressure is well controlled on losartan 50mg daily and amlodipine 5mg daily.  She is tolerating moderate dose intensity statin therapy.  She denies any angina.      Mixed hyperlipidemia -continue rosuvastatin 20 mg daily.  LDL 48 02/2024.      Essential hypertension -blood pressure is well controlled.          Problem List Items Addressed This Visit          Circulatory    Coronary artery disease of native artery of native heart with stable angina pectoris (HCC) - Primary (Chronic)    Relevant Orders    EKG 12 Lead - Clinic Performed (Completed)    Echo (TTE) complete (PRN contrast/bubble/strain/3D)    Essential hypertension    Relevant Orders    EKG 12 Lead - Clinic Performed (Completed)       Other    Hyperlipidemia     Other Visit Diagnoses       Severe obesity (BMI 35.0-39.9) with comorbidity (HCC)            There are no discontinued medications.            Patient has been instructed and agrees to call our office with any issues or other concerns related to their cardiac condition(s) and/or complaint(s).      Return in about 6 months (around 8/26/2024).       HIEN GOODSON MD  2/26/2024

## 2024-03-01 DIAGNOSIS — I24.89 DEMAND ISCHEMIA (HCC): ICD-10-CM

## 2024-03-04 RX ORDER — ASPIRIN 81 MG/1
81 TABLET, CHEWABLE ORAL
Qty: 90 TABLET | Refills: 3 | OUTPATIENT
Start: 2024-03-04

## 2024-08-12 ENCOUNTER — TELEPHONE (OUTPATIENT)
Dept: CARDIOLOGY | Age: 59
End: 2024-08-12

## 2024-08-12 ENCOUNTER — TELEMEDICINE (OUTPATIENT)
Dept: INTERNAL MEDICINE CLINIC | Facility: CLINIC | Age: 59
End: 2024-08-12
Payer: COMMERCIAL

## 2024-08-12 DIAGNOSIS — U07.1 POSITIVE SELF-ADMINISTERED ANTIGEN TEST FOR COVID-19: Primary | ICD-10-CM

## 2024-08-12 PROCEDURE — 99213 OFFICE O/P EST LOW 20 MIN: CPT | Performed by: NURSE PRACTITIONER

## 2024-08-12 RX ORDER — BENZONATATE 200 MG/1
200 CAPSULE ORAL 3 TIMES DAILY PRN
Qty: 20 CAPSULE | Refills: 0 | Status: SHIPPED | OUTPATIENT
Start: 2024-08-12 | End: 2024-08-19

## 2024-08-12 RX ORDER — DIMENHYDRINATE 50 MG
100 TABLET ORAL DAILY
COMMUNITY

## 2024-08-12 RX ORDER — FLUTICASONE PROPIONATE 50 MCG
2 SPRAY, SUSPENSION (ML) NASAL DAILY
Qty: 16 G | Refills: 0 | Status: SHIPPED | OUTPATIENT
Start: 2024-08-12

## 2024-08-12 SDOH — ECONOMIC STABILITY: INCOME INSECURITY: HOW HARD IS IT FOR YOU TO PAY FOR THE VERY BASICS LIKE FOOD, HOUSING, MEDICAL CARE, AND HEATING?: NOT HARD AT ALL

## 2024-08-12 SDOH — ECONOMIC STABILITY: FOOD INSECURITY: WITHIN THE PAST 12 MONTHS, YOU WORRIED THAT YOUR FOOD WOULD RUN OUT BEFORE YOU GOT MONEY TO BUY MORE.: NEVER TRUE

## 2024-08-12 SDOH — ECONOMIC STABILITY: FOOD INSECURITY: WITHIN THE PAST 12 MONTHS, THE FOOD YOU BOUGHT JUST DIDN'T LAST AND YOU DIDN'T HAVE MONEY TO GET MORE.: NEVER TRUE

## 2024-08-12 ASSESSMENT — ENCOUNTER SYMPTOMS
SORE THROAT: 0
SINUS PRESSURE: 1
SHORTNESS OF BREATH: 0
COUGH: 1
DIARRHEA: 0
VOMITING: 0
WHEEZING: 0
RHINORRHEA: 1
NAUSEA: 0
ABDOMINAL PAIN: 0

## 2024-08-12 ASSESSMENT — PATIENT HEALTH QUESTIONNAIRE - PHQ9
SUM OF ALL RESPONSES TO PHQ QUESTIONS 1-9: 0
1. LITTLE INTEREST OR PLEASURE IN DOING THINGS: NOT AT ALL
SUM OF ALL RESPONSES TO PHQ QUESTIONS 1-9: 0
SUM OF ALL RESPONSES TO PHQ QUESTIONS 1-9: 0
SUM OF ALL RESPONSES TO PHQ9 QUESTIONS 1 & 2: 0
SUM OF ALL RESPONSES TO PHQ QUESTIONS 1-9: 0
2. FEELING DOWN, DEPRESSED OR HOPELESS: NOT AT ALL

## 2024-08-12 NOTE — TELEPHONE ENCOUNTER
1715 -- Pt has COVID. Had VV with PCP today and was prescribed Paxlovid. Told to call our office as she is also on Brilinta.     D/w Pt that she should NOT take the Paxlovid with the Brilinta de to increased for bleeding.     She is far enough out from her PC (1/2023) that she can HOLD the Brilinta, take the Paxlovid, then resume the Brlinta. Pt v/u.      YASH HorvathC

## 2024-08-13 NOTE — PROGRESS NOTES
Patient spoke with NP at cardiology office yesterday evening.       1715 -- Pt has COVID. Had VV with PCP today and was prescribed Paxlovid. Told to call our office as she is also on Brilinta.      D/w Pt that she should NOT take the Paxlovid with the Brilinta de to increased for bleeding.      She is far enough out from her PC (1/2023) that she can HOLD the Brilinta, take the Paxlovid, then resume the Brlinta. Pt v/u.        Yakelin Gracias, YASHC        
visit.    --Lizzie Manzanares, APRDODIE - CNP

## 2024-08-19 ENCOUNTER — LAB (OUTPATIENT)
Dept: INTERNAL MEDICINE CLINIC | Facility: CLINIC | Age: 59
End: 2024-08-19

## 2024-08-19 DIAGNOSIS — E78.2 MIXED HYPERLIPIDEMIA: ICD-10-CM

## 2024-08-19 DIAGNOSIS — I24.89 DEMAND ISCHEMIA (HCC): ICD-10-CM

## 2024-08-19 DIAGNOSIS — I25.118 CORONARY ARTERY DISEASE OF NATIVE ARTERY OF NATIVE HEART WITH STABLE ANGINA PECTORIS (HCC): ICD-10-CM

## 2024-08-19 DIAGNOSIS — I10 ESSENTIAL HYPERTENSION: ICD-10-CM

## 2024-08-19 LAB
ALT SERPL-CCNC: 28 U/L (ref 12–65)
ANION GAP SERPL CALC-SCNC: 11 MMOL/L (ref 9–18)
AST SERPL-CCNC: 17 U/L (ref 15–37)
BUN SERPL-MCNC: 17 MG/DL (ref 8–23)
CALCIUM SERPL-MCNC: 9.3 MG/DL (ref 8.8–10.2)
CHLORIDE SERPL-SCNC: 100 MMOL/L (ref 98–107)
CHOLEST SERPL-MCNC: 170 MG/DL (ref 0–200)
CO2 SERPL-SCNC: 28 MMOL/L (ref 20–28)
CREAT SERPL-MCNC: 0.71 MG/DL (ref 0.6–1.1)
GLUCOSE SERPL-MCNC: 92 MG/DL (ref 70–99)
HDLC SERPL-MCNC: 36 MG/DL (ref 40–60)
HDLC SERPL: 4.7 (ref 0–5)
LDLC SERPL CALC-MCNC: 90 MG/DL (ref 0–100)
POTASSIUM SERPL-SCNC: 4.2 MMOL/L (ref 3.5–5.1)
SODIUM SERPL-SCNC: 138 MMOL/L (ref 136–145)
TRIGL SERPL-MCNC: 220 MG/DL (ref 0–150)
VLDLC SERPL CALC-MCNC: 44 MG/DL (ref 6–23)

## 2024-08-22 ENCOUNTER — OFFICE VISIT (OUTPATIENT)
Dept: INTERNAL MEDICINE CLINIC | Facility: CLINIC | Age: 59
End: 2024-08-22
Payer: COMMERCIAL

## 2024-08-22 VITALS
BODY MASS INDEX: 33.68 KG/M2 | HEART RATE: 58 BPM | OXYGEN SATURATION: 99 % | WEIGHT: 183 LBS | HEIGHT: 62 IN | DIASTOLIC BLOOD PRESSURE: 80 MMHG | TEMPERATURE: 97.8 F | SYSTOLIC BLOOD PRESSURE: 132 MMHG

## 2024-08-22 DIAGNOSIS — F40.243 ANXIETY WITH FLYING: ICD-10-CM

## 2024-08-22 DIAGNOSIS — I10 ESSENTIAL HYPERTENSION: Primary | ICD-10-CM

## 2024-08-22 DIAGNOSIS — E78.2 MIXED HYPERLIPIDEMIA: ICD-10-CM

## 2024-08-22 DIAGNOSIS — R45.89 ANXIETY ABOUT HEALTH: ICD-10-CM

## 2024-08-22 PROCEDURE — 3075F SYST BP GE 130 - 139MM HG: CPT | Performed by: INTERNAL MEDICINE

## 2024-08-22 PROCEDURE — 99214 OFFICE O/P EST MOD 30 MIN: CPT | Performed by: INTERNAL MEDICINE

## 2024-08-22 PROCEDURE — 3079F DIAST BP 80-89 MM HG: CPT | Performed by: INTERNAL MEDICINE

## 2024-08-22 RX ORDER — LOSARTAN POTASSIUM 50 MG/1
50 TABLET ORAL DAILY
Qty: 90 TABLET | Refills: 3 | Status: SHIPPED | OUTPATIENT
Start: 2024-08-22

## 2024-08-22 RX ORDER — BUSPIRONE HYDROCHLORIDE 7.5 MG/1
7.5 TABLET ORAL PRN
Qty: 60 TABLET | Refills: 5 | Status: SHIPPED | OUTPATIENT
Start: 2024-08-22

## 2024-08-22 RX ORDER — ROSUVASTATIN CALCIUM 20 MG/1
20 TABLET, COATED ORAL NIGHTLY
Qty: 90 TABLET | Refills: 3 | Status: SHIPPED | OUTPATIENT
Start: 2024-08-22

## 2024-08-22 RX ORDER — CLORAZEPATE DIPOTASSIUM 7.5 MG/1
7.5 TABLET ORAL 2 TIMES DAILY PRN
Qty: 60 TABLET | Refills: 0 | Status: SHIPPED | OUTPATIENT
Start: 2024-08-22 | End: 2024-09-21

## 2024-08-22 RX ORDER — ESTRADIOL 0.1 MG/D
1 FILM, EXTENDED RELEASE TRANSDERMAL
Qty: 8 PATCH | Refills: 3 | Status: SHIPPED | OUTPATIENT
Start: 2024-08-22

## 2024-08-22 RX ORDER — AMLODIPINE BESYLATE 5 MG/1
5 TABLET ORAL DAILY
Qty: 90 TABLET | Refills: 3 | Status: SHIPPED | OUTPATIENT
Start: 2024-08-22

## 2024-08-22 SDOH — ECONOMIC STABILITY: FOOD INSECURITY: WITHIN THE PAST 12 MONTHS, THE FOOD YOU BOUGHT JUST DIDN'T LAST AND YOU DIDN'T HAVE MONEY TO GET MORE.: NEVER TRUE

## 2024-08-22 SDOH — ECONOMIC STABILITY: INCOME INSECURITY: HOW HARD IS IT FOR YOU TO PAY FOR THE VERY BASICS LIKE FOOD, HOUSING, MEDICAL CARE, AND HEATING?: NOT HARD AT ALL

## 2024-08-22 SDOH — ECONOMIC STABILITY: FOOD INSECURITY: WITHIN THE PAST 12 MONTHS, YOU WORRIED THAT YOUR FOOD WOULD RUN OUT BEFORE YOU GOT MONEY TO BUY MORE.: NEVER TRUE

## 2024-08-22 ASSESSMENT — PATIENT HEALTH QUESTIONNAIRE - PHQ9
SUM OF ALL RESPONSES TO PHQ QUESTIONS 1-9: 0
2. FEELING DOWN, DEPRESSED OR HOPELESS: NOT AT ALL
SUM OF ALL RESPONSES TO PHQ QUESTIONS 1-9: 0
SUM OF ALL RESPONSES TO PHQ QUESTIONS 1-9: 0
SUM OF ALL RESPONSES TO PHQ9 QUESTIONS 1 & 2: 0
SUM OF ALL RESPONSES TO PHQ QUESTIONS 1-9: 0
1. LITTLE INTEREST OR PLEASURE IN DOING THINGS: NOT AT ALL

## 2024-08-23 ENCOUNTER — TELEPHONE (OUTPATIENT)
Dept: INTERNAL MEDICINE CLINIC | Facility: CLINIC | Age: 59
End: 2024-08-23

## 2024-08-23 NOTE — TELEPHONE ENCOUNTER
Pt called w/concerns of UTI, bloat, frequency, urgency, burning, pressure. No appts available today, made pt aware of option of UC. Pt voiced understanding.

## 2024-09-23 ENCOUNTER — OFFICE VISIT (OUTPATIENT)
Age: 59
End: 2024-09-23
Payer: COMMERCIAL

## 2024-09-23 VITALS
HEART RATE: 58 BPM | BODY MASS INDEX: 34.52 KG/M2 | HEIGHT: 62 IN | DIASTOLIC BLOOD PRESSURE: 78 MMHG | SYSTOLIC BLOOD PRESSURE: 138 MMHG | WEIGHT: 187.6 LBS

## 2024-09-23 DIAGNOSIS — E78.00 PURE HYPERCHOLESTEROLEMIA: ICD-10-CM

## 2024-09-23 DIAGNOSIS — I25.118 CORONARY ARTERY DISEASE OF NATIVE ARTERY OF NATIVE HEART WITH STABLE ANGINA PECTORIS (HCC): Primary | Chronic | ICD-10-CM

## 2024-09-23 DIAGNOSIS — I10 ESSENTIAL HYPERTENSION: ICD-10-CM

## 2024-09-23 PROCEDURE — 3075F SYST BP GE 130 - 139MM HG: CPT | Performed by: INTERNAL MEDICINE

## 2024-09-23 PROCEDURE — 99214 OFFICE O/P EST MOD 30 MIN: CPT | Performed by: INTERNAL MEDICINE

## 2024-09-23 PROCEDURE — 3078F DIAST BP <80 MM HG: CPT | Performed by: INTERNAL MEDICINE

## 2024-09-23 ASSESSMENT — ENCOUNTER SYMPTOMS
ABDOMINAL PAIN: 0
SHORTNESS OF BREATH: 0
BACK PAIN: 0
COUGH: 0

## 2024-10-30 ENCOUNTER — HOSPITAL ENCOUNTER (OUTPATIENT)
Dept: MAMMOGRAPHY | Age: 59
Discharge: HOME | End: 2024-11-02
Payer: COMMERCIAL

## 2024-10-30 DIAGNOSIS — Z12.31 ENCOUNTER FOR SCREENING MAMMOGRAM FOR MALIGNANT NEOPLASM OF BREAST: ICD-10-CM

## 2024-10-30 PROCEDURE — 77063 BREAST TOMOSYNTHESIS BI: CPT

## 2024-11-05 ENCOUNTER — ANCILLARY ORDERS (OUTPATIENT)
Dept: MAMMOGRAPHY | Age: 59
End: 2024-11-05

## 2024-11-05 DIAGNOSIS — R92.8 ABNORMAL SCREENING MAMMOGRAM: Primary | ICD-10-CM

## 2024-11-14 ENCOUNTER — HOSPITAL ENCOUNTER (OUTPATIENT)
Dept: MAMMOGRAPHY | Age: 59
Discharge: HOME | End: 2024-11-17
Attending: INTERNAL MEDICINE
Payer: COMMERCIAL

## 2024-11-14 DIAGNOSIS — R92.8 ABNORMAL SCREENING MAMMOGRAM: ICD-10-CM

## 2024-11-14 PROCEDURE — G0279 TOMOSYNTHESIS, MAMMO: HCPCS

## 2024-11-14 PROCEDURE — 76642 ULTRASOUND BREAST LIMITED: CPT

## 2024-11-18 NOTE — RESULT ENCOUNTER NOTE
Six month follow up mammogram and ultrasound orders placed as recommended by radiology.    RANDY MELENDREZ MD

## 2024-11-18 NOTE — RESULT ENCOUNTER NOTE
Short term follow up exam orders placed as recommended by radiology for six month follow up.    RANDY MELENDREZ MD

## 2024-12-10 DIAGNOSIS — U07.1 POSITIVE SELF-ADMINISTERED ANTIGEN TEST FOR COVID-19: ICD-10-CM

## 2024-12-10 DIAGNOSIS — E78.2 MIXED HYPERLIPIDEMIA: ICD-10-CM

## 2024-12-10 DIAGNOSIS — R45.89 ANXIETY ABOUT HEALTH: ICD-10-CM

## 2024-12-10 DIAGNOSIS — I24.89 DEMAND ISCHEMIA (HCC): ICD-10-CM

## 2024-12-10 DIAGNOSIS — I10 ESSENTIAL HYPERTENSION: ICD-10-CM

## 2024-12-10 RX ORDER — LOSARTAN POTASSIUM 50 MG/1
TABLET ORAL
Refills: 0 | OUTPATIENT
Start: 2024-12-10

## 2024-12-10 RX ORDER — AMLODIPINE BESYLATE 5 MG/1
TABLET ORAL
Refills: 0 | OUTPATIENT
Start: 2024-12-10

## 2024-12-10 RX ORDER — BUSPIRONE HYDROCHLORIDE 7.5 MG/1
TABLET ORAL
Refills: 0 | OUTPATIENT
Start: 2024-12-10

## 2024-12-10 RX ORDER — ESTRADIOL 0.1 MG/D
FILM, EXTENDED RELEASE TRANSDERMAL
Refills: 0 | OUTPATIENT
Start: 2024-12-10

## 2024-12-10 RX ORDER — FLUTICASONE PROPIONATE 50 MCG
SPRAY, SUSPENSION (ML) NASAL
Refills: 0 | OUTPATIENT
Start: 2024-12-10

## 2024-12-10 RX ORDER — ROSUVASTATIN CALCIUM 20 MG/1
TABLET, COATED ORAL
Refills: 0 | OUTPATIENT
Start: 2024-12-10

## 2024-12-10 RX ORDER — ASPIRIN 81 MG/1
TABLET, CHEWABLE ORAL
Refills: 0 | OUTPATIENT
Start: 2024-12-10

## 2024-12-11 ENCOUNTER — PATIENT MESSAGE (OUTPATIENT)
Dept: INTERNAL MEDICINE CLINIC | Facility: CLINIC | Age: 59
End: 2024-12-11

## 2024-12-11 DIAGNOSIS — I10 ESSENTIAL HYPERTENSION: ICD-10-CM

## 2024-12-11 DIAGNOSIS — R45.89 ANXIETY ABOUT HEALTH: ICD-10-CM

## 2024-12-11 DIAGNOSIS — E78.2 MIXED HYPERLIPIDEMIA: ICD-10-CM

## 2024-12-12 RX ORDER — ESTRADIOL 0.1 MG/D
1 FILM, EXTENDED RELEASE TRANSDERMAL
Qty: 8 PATCH | Refills: 3 | Status: SHIPPED | OUTPATIENT
Start: 2024-12-12

## 2024-12-12 RX ORDER — LOSARTAN POTASSIUM 50 MG/1
50 TABLET ORAL DAILY
Qty: 90 TABLET | Refills: 3 | Status: SHIPPED | OUTPATIENT
Start: 2024-12-12

## 2024-12-12 RX ORDER — AMLODIPINE BESYLATE 5 MG/1
5 TABLET ORAL DAILY
Qty: 90 TABLET | Refills: 3 | Status: SHIPPED | OUTPATIENT
Start: 2024-12-12

## 2024-12-12 RX ORDER — ROSUVASTATIN CALCIUM 20 MG/1
20 TABLET, COATED ORAL NIGHTLY
Qty: 90 TABLET | Refills: 3 | Status: SHIPPED | OUTPATIENT
Start: 2024-12-12

## 2024-12-12 RX ORDER — BUSPIRONE HYDROCHLORIDE 7.5 MG/1
7.5 TABLET ORAL PRN
Qty: 60 TABLET | Refills: 5 | Status: SHIPPED | OUTPATIENT
Start: 2024-12-12 | End: 2024-12-18 | Stop reason: SDUPTHER

## 2024-12-18 RX ORDER — BUSPIRONE HYDROCHLORIDE 7.5 MG/1
7.5 TABLET ORAL PRN
Qty: 60 TABLET | Refills: 5 | Status: SHIPPED | OUTPATIENT
Start: 2024-12-18

## 2025-02-17 ENCOUNTER — LAB (OUTPATIENT)
Dept: INTERNAL MEDICINE CLINIC | Facility: CLINIC | Age: 60
End: 2025-02-17

## 2025-02-17 DIAGNOSIS — I10 ESSENTIAL HYPERTENSION: ICD-10-CM

## 2025-02-17 DIAGNOSIS — E78.2 MIXED HYPERLIPIDEMIA: ICD-10-CM

## 2025-02-17 LAB
ALBUMIN SERPL-MCNC: 4.2 G/DL (ref 3.2–4.6)
ALBUMIN/GLOB SERPL: 1.3 (ref 1–1.9)
ALP SERPL-CCNC: 66 U/L (ref 35–104)
ALT SERPL-CCNC: 33 U/L (ref 8–45)
ANION GAP SERPL CALC-SCNC: 10 MMOL/L (ref 7–16)
AST SERPL-CCNC: 25 U/L (ref 15–37)
BILIRUB SERPL-MCNC: 0.3 MG/DL (ref 0–1.2)
BUN SERPL-MCNC: 11 MG/DL (ref 8–23)
CALCIUM SERPL-MCNC: 9.3 MG/DL (ref 8.8–10.2)
CHLORIDE SERPL-SCNC: 102 MMOL/L (ref 98–107)
CHOLEST SERPL-MCNC: 115 MG/DL (ref 0–200)
CO2 SERPL-SCNC: 27 MMOL/L (ref 20–29)
CREAT SERPL-MCNC: 0.83 MG/DL (ref 0.6–1.1)
ERYTHROCYTE [DISTWIDTH] IN BLOOD BY AUTOMATED COUNT: 12.1 % (ref 11.9–14.6)
GLOBULIN SER CALC-MCNC: 3.3 G/DL (ref 2.3–3.5)
GLUCOSE SERPL-MCNC: 100 MG/DL (ref 70–99)
HCT VFR BLD AUTO: 40.9 % (ref 35.8–46.3)
HDLC SERPL-MCNC: 32 MG/DL (ref 40–60)
HDLC SERPL: 3.6 (ref 0–5)
HGB BLD-MCNC: 13.4 G/DL (ref 11.7–15.4)
LDLC SERPL CALC-MCNC: 43 MG/DL (ref 0–100)
MCH RBC QN AUTO: 29.1 PG (ref 26.1–32.9)
MCHC RBC AUTO-ENTMCNC: 32.8 G/DL (ref 31.4–35)
MCV RBC AUTO: 88.7 FL (ref 82–102)
NRBC # BLD: 0 K/UL (ref 0–0.2)
PLATELET # BLD AUTO: 291 K/UL (ref 150–450)
PMV BLD AUTO: 11.4 FL (ref 9.4–12.3)
POTASSIUM SERPL-SCNC: 4.3 MMOL/L (ref 3.5–5.1)
PROT SERPL-MCNC: 7.4 G/DL (ref 6.3–8.2)
RBC # BLD AUTO: 4.61 M/UL (ref 4.05–5.2)
SODIUM SERPL-SCNC: 139 MMOL/L (ref 136–145)
TRIGL SERPL-MCNC: 204 MG/DL (ref 0–150)
VLDLC SERPL CALC-MCNC: 41 MG/DL (ref 6–23)
WBC # BLD AUTO: 6.4 K/UL (ref 4.3–11.1)

## 2025-02-21 SDOH — ECONOMIC STABILITY: FOOD INSECURITY: WITHIN THE PAST 12 MONTHS, THE FOOD YOU BOUGHT JUST DIDN'T LAST AND YOU DIDN'T HAVE MONEY TO GET MORE.: NEVER TRUE

## 2025-02-21 SDOH — ECONOMIC STABILITY: FOOD INSECURITY: WITHIN THE PAST 12 MONTHS, YOU WORRIED THAT YOUR FOOD WOULD RUN OUT BEFORE YOU GOT MONEY TO BUY MORE.: NEVER TRUE

## 2025-02-21 SDOH — ECONOMIC STABILITY: TRANSPORTATION INSECURITY
IN THE PAST 12 MONTHS, HAS THE LACK OF TRANSPORTATION KEPT YOU FROM MEDICAL APPOINTMENTS OR FROM GETTING MEDICATIONS?: NO

## 2025-02-21 SDOH — ECONOMIC STABILITY: INCOME INSECURITY: IN THE LAST 12 MONTHS, WAS THERE A TIME WHEN YOU WERE NOT ABLE TO PAY THE MORTGAGE OR RENT ON TIME?: NO

## 2025-02-21 ASSESSMENT — PATIENT HEALTH QUESTIONNAIRE - PHQ9
SUM OF ALL RESPONSES TO PHQ9 QUESTIONS 1 & 2: 0
2. FEELING DOWN, DEPRESSED OR HOPELESS: NOT AT ALL
SUM OF ALL RESPONSES TO PHQ QUESTIONS 1-9: 0
1. LITTLE INTEREST OR PLEASURE IN DOING THINGS: NOT AT ALL
SUM OF ALL RESPONSES TO PHQ9 QUESTIONS 1 & 2: 0
SUM OF ALL RESPONSES TO PHQ QUESTIONS 1-9: 0
2. FEELING DOWN, DEPRESSED OR HOPELESS: NOT AT ALL
SUM OF ALL RESPONSES TO PHQ QUESTIONS 1-9: 0
1. LITTLE INTEREST OR PLEASURE IN DOING THINGS: NOT AT ALL
SUM OF ALL RESPONSES TO PHQ QUESTIONS 1-9: 0

## 2025-02-24 ENCOUNTER — OFFICE VISIT (OUTPATIENT)
Dept: INTERNAL MEDICINE CLINIC | Facility: CLINIC | Age: 60
End: 2025-02-24
Payer: COMMERCIAL

## 2025-02-24 VITALS
BODY MASS INDEX: 34.6 KG/M2 | HEIGHT: 62 IN | TEMPERATURE: 98.5 F | OXYGEN SATURATION: 99 % | WEIGHT: 188 LBS | HEART RATE: 57 BPM | SYSTOLIC BLOOD PRESSURE: 138 MMHG | DIASTOLIC BLOOD PRESSURE: 76 MMHG

## 2025-02-24 DIAGNOSIS — Z78.0 POST-MENOPAUSAL: ICD-10-CM

## 2025-02-24 DIAGNOSIS — I10 ESSENTIAL HYPERTENSION: ICD-10-CM

## 2025-02-24 DIAGNOSIS — I24.89 DEMAND ISCHEMIA (HCC): ICD-10-CM

## 2025-02-24 DIAGNOSIS — R45.89 ANXIETY ABOUT HEALTH: ICD-10-CM

## 2025-02-24 DIAGNOSIS — E78.2 MIXED HYPERLIPIDEMIA: Primary | ICD-10-CM

## 2025-02-24 DIAGNOSIS — I25.118 CORONARY ARTERY DISEASE OF NATIVE ARTERY OF NATIVE HEART WITH STABLE ANGINA PECTORIS: Chronic | ICD-10-CM

## 2025-02-24 PROBLEM — Z76.89 ESTABLISHING CARE WITH NEW DOCTOR, ENCOUNTER FOR: Status: RESOLVED | Noted: 2021-06-16 | Resolved: 2025-02-24

## 2025-02-24 PROBLEM — R74.8 ELEVATION OF CARDIAC ENZYMES: Status: RESOLVED | Noted: 2023-01-09 | Resolved: 2025-02-24

## 2025-02-24 PROBLEM — D72.829 LEUKOCYTOSIS: Status: RESOLVED | Noted: 2023-01-10 | Resolved: 2025-02-24

## 2025-02-24 PROCEDURE — 99214 OFFICE O/P EST MOD 30 MIN: CPT | Performed by: INTERNAL MEDICINE

## 2025-02-24 PROCEDURE — 3075F SYST BP GE 130 - 139MM HG: CPT | Performed by: INTERNAL MEDICINE

## 2025-02-24 PROCEDURE — 3078F DIAST BP <80 MM HG: CPT | Performed by: INTERNAL MEDICINE

## 2025-02-24 RX ORDER — ASPIRIN 81 MG/1
81 TABLET, CHEWABLE ORAL DAILY
Qty: 90 TABLET | Refills: 3 | Status: SHIPPED | OUTPATIENT
Start: 2025-02-24

## 2025-02-24 RX ORDER — FLUTICASONE PROPIONATE 50 MCG
2 SPRAY, SUSPENSION (ML) NASAL DAILY
Qty: 16 G | Refills: 3 | Status: SHIPPED | OUTPATIENT
Start: 2025-02-24

## 2025-02-24 RX ORDER — AMLODIPINE BESYLATE 5 MG/1
5 TABLET ORAL DAILY
Qty: 90 TABLET | Refills: 3 | Status: CANCELLED | OUTPATIENT
Start: 2025-02-24

## 2025-02-24 RX ORDER — ESTRADIOL 0.1 MG/D
1 FILM, EXTENDED RELEASE TRANSDERMAL
Qty: 8 PATCH | Refills: 3 | Status: CANCELLED | OUTPATIENT
Start: 2025-02-24

## 2025-02-24 RX ORDER — LOSARTAN POTASSIUM 50 MG/1
50 TABLET ORAL DAILY
Qty: 90 TABLET | Refills: 3 | Status: CANCELLED | OUTPATIENT
Start: 2025-02-24

## 2025-02-24 RX ORDER — ROSUVASTATIN CALCIUM 20 MG/1
20 TABLET, COATED ORAL NIGHTLY
Qty: 90 TABLET | Refills: 3 | Status: CANCELLED | OUTPATIENT
Start: 2025-02-24

## 2025-02-24 NOTE — ASSESSMENT & PLAN NOTE
Well-controlled, continue current medications    Lab Results   Component Value Date/Time     02/17/2025 09:49 AM    K 4.3 02/17/2025 09:49 AM     02/17/2025 09:49 AM    CO2 27 02/17/2025 09:49 AM    BUN 11 02/17/2025 09:49 AM    CREATININE 0.83 02/17/2025 09:49 AM    GLUCOSE 100 02/17/2025 09:49 AM    CALCIUM 9.3 02/17/2025 09:49 AM    LABGLOM 80 02/17/2025 09:49 AM    LABGLOM >60 02/15/2024 08:49 AM      Hypertension Medications       Calcium Channel Blockers       amLODIPine (NORVASC) 5 MG tablet Take 1 tablet by mouth daily       Angiotensin II Receptor Antagonists       losartan (COZAAR) 50 MG tablet Take 1 tablet by mouth daily

## 2025-02-24 NOTE — ASSESSMENT & PLAN NOTE
Monitored by specialist- no acute findings meriting change in the plan  Treatment regimen is effective.     Cardiology notes reviewed.    Cardiac Medications       Calcium Channel Blockers       amLODIPine (NORVASC) 5 MG tablet Take 1 tablet by mouth daily       HMG CoA Reductase Inhibitors       rosuvastatin (CRESTOR) 20 MG tablet Take 1 tablet by mouth nightly       Angiotensin II Receptor Antagonists       losartan (COZAAR) 50 MG tablet Take 1 tablet by mouth daily       Salicylates       aspirin 81 MG chewable tablet Take 1 tablet by mouth daily       Platelet Aggregation Inhibitors       ticagrelor (BRILINTA) 90 MG TABS tablet Take 1 tablet by mouth 2 times daily

## 2025-02-24 NOTE — PROGRESS NOTES
ASSESSMENT/PLAN:  Assessment & Plan  1. Anxiety.  She reports using BuSpar as needed for anxiety and finds it effective. She mentioned considering Ativan for more severe anxiety episodes. She is advised to continue with BuSpar as needed and to inform if she decides to use Ativan.    2. Carotid artery disease, CAD.  She is currently on Brilinta and tolerating it well without any side effects such as bleeding or shortness of breath. The continuation of Brilinta is to minimize the risk of an event given her carotid condition. She is advised to continue Brilinta as prescribed.    3. Health maintenance.  Her recent labs, including liver function and CBC, are normal. Her total cholesterol is 115 mg/dL, and LDL is 43 mg/dL, which are within the desired range. Blood pressure readings are also within the normal range. A breast ultrasound has been ordered, and she is reminded to schedule it.    1. Mixed hyperlipidemia  Assessment & Plan:     Treatment regimen is effective.       Lab Results   Component Value Date    CHOL 115 02/17/2025    TRIG 204 (H) 02/17/2025    HDL 32 (L) 02/17/2025    LDL 43 02/17/2025    VLDL 41 (H) 02/17/2025    CHOLHDLRATIO 3.6 02/17/2025     Lab Results   Component Value Date    ALT 33 02/17/2025    AST 25 02/17/2025    ALKPHOS 66 02/17/2025    BILITOT 0.3 02/17/2025     The patient is asked to make an attempt to improve diet and exercise patterns to aid in medical management of this problem.    Lipid Lowering Medications       HMG CoA Reductase Inhibitors       rosuvastatin (CRESTOR) 20 MG tablet Take 1 tablet by mouth nightly            Orders:  -     fluticasone (FLONASE) 50 MCG/ACT nasal spray; 2 sprays by Nasal route daily, Disp-16 g, R-3Normal  -     aspirin 81 MG chewable tablet; Take 1 tablet by mouth daily, Disp-90 tablet, R-3Normal  -     Lipid Panel; Future  -     Hemoglobin A1C; Future  -     Urinalysis; Future  -     TSH; Future  -     Comprehensive Metabolic Panel; Future  -

## 2025-02-24 NOTE — ASSESSMENT & PLAN NOTE
Treatment regimen is effective.       Lab Results   Component Value Date    CHOL 115 02/17/2025    TRIG 204 (H) 02/17/2025    HDL 32 (L) 02/17/2025    LDL 43 02/17/2025    VLDL 41 (H) 02/17/2025    CHOLHDLRATIO 3.6 02/17/2025     Lab Results   Component Value Date    ALT 33 02/17/2025    AST 25 02/17/2025    ALKPHOS 66 02/17/2025    BILITOT 0.3 02/17/2025     The patient is asked to make an attempt to improve diet and exercise patterns to aid in medical management of this problem.    Lipid Lowering Medications       HMG CoA Reductase Inhibitors       rosuvastatin (CRESTOR) 20 MG tablet Take 1 tablet by mouth nightly             Patient/Caregiver provided printed discharge information.

## 2025-02-24 NOTE — PATIENT INSTRUCTIONS
Mild Cognitive Impairment (MCI) and dementia are both conditions that affect cognitive functioning, but they differ in severity, progression, and impact on daily life. Here’s a breakdown of the key differences:    1. Definition:  Mild Cognitive Impairment (MCI): MCI is characterized by noticeable but mild memory or cognitive problems that are more significant than normal age-related changes but not severe enough to interfere with daily life. People with MCI can have issues with memory, attention, language, or executive function (planning, decision-making, etc.), but these difficulties are not disabling.  Dementia: Dementia is an umbrella term for a group of symptoms that affect memory, thinking, behavior, and the ability to perform everyday tasks. It is a more severe condition than MCI and involves a decline in cognitive abilities that interferes with social or occupational functioning. Alzheimer’s disease is the most common cause of dementia.  2. Severity:  MCI: The cognitive decline is noticeable but relatively mild. People with MCI may experience occasional forgetfulness or difficulty concentrating, but they can usually continue with daily activities.  Dementia: The cognitive impairment is more severe and progressive. As dementia progresses, it can significantly affect memory, thinking, judgment, and the ability to perform daily tasks, such as cooking, cleaning, or managing finances.  3. Impact on Daily Life:  MCI: Individuals with MCI may still function independently in their daily activities. They might need some assistance or experience frustration but do not usually require full-time help.  Dementia: Dementia can impair the ability to manage everyday activities. As the condition advances, people with dementia may need significant assistance with daily tasks, such as dressing, eating, and personal hygiene.  4. Progression:  MCI: Not everyone with MCI will develop dementia. Some people with MCI remain stable or

## 2025-03-20 ENCOUNTER — OFFICE VISIT (OUTPATIENT)
Age: 60
End: 2025-03-20
Payer: COMMERCIAL

## 2025-03-20 VITALS
HEART RATE: 58 BPM | BODY MASS INDEX: 34.41 KG/M2 | DIASTOLIC BLOOD PRESSURE: 68 MMHG | HEIGHT: 62 IN | SYSTOLIC BLOOD PRESSURE: 140 MMHG | WEIGHT: 187 LBS

## 2025-03-20 DIAGNOSIS — E78.00 PURE HYPERCHOLESTEROLEMIA: ICD-10-CM

## 2025-03-20 DIAGNOSIS — I10 ESSENTIAL HYPERTENSION: Primary | ICD-10-CM

## 2025-03-20 DIAGNOSIS — I25.118 CORONARY ARTERY DISEASE OF NATIVE ARTERY OF NATIVE HEART WITH STABLE ANGINA PECTORIS: Chronic | ICD-10-CM

## 2025-03-20 PROCEDURE — 99214 OFFICE O/P EST MOD 30 MIN: CPT | Performed by: INTERNAL MEDICINE

## 2025-03-20 PROCEDURE — 93000 ELECTROCARDIOGRAM COMPLETE: CPT | Performed by: INTERNAL MEDICINE

## 2025-03-20 PROCEDURE — 3077F SYST BP >= 140 MM HG: CPT | Performed by: INTERNAL MEDICINE

## 2025-03-20 PROCEDURE — 3078F DIAST BP <80 MM HG: CPT | Performed by: INTERNAL MEDICINE

## 2025-03-20 ASSESSMENT — ENCOUNTER SYMPTOMS
BACK PAIN: 0
ABDOMINAL PAIN: 0
SHORTNESS OF BREATH: 0
COUGH: 0

## 2025-03-20 NOTE — PROGRESS NOTES
daily       No current facility-administered medications for this visit.     Patient Active Problem List    Diagnosis Date Noted    Coronary artery disease of native artery of native heart with stable angina pectoris 01/10/2023     Priority: Medium     2023: PCI dRCA 3.0 x 26 Umesh drug-eluting stent postdilated to 3.5 mm      Family history of neurofibromatosis 2022     Priority: Medium    Claustrophobia 2022     Priority: Medium    Post-menopausal 2021     Priority: Low    Essential hypertension 2017     Priority: Low    Vertigo 2017     Priority: Low    Hyperlipidemia 2015     Priority: Low      Family History   Problem Relation Age of Onset    Elevated Lipids Mother     Hypertension Mother     Coronary Art Dis Mother     Coronary Art Dis Father         premature      Social History     Tobacco Use    Smoking status: Former     Current packs/day: 0.00     Average packs/day: 0.3 packs/day for 8.5 years (2.1 ttl pk-yrs)     Types: Cigarettes     Start date: 1983     Quit date: 1991     Years since quittin.7    Smokeless tobacco: Never   Substance Use Topics    Alcohol use: No       Review Of Symptoms    Review of Systems   Constitutional: Negative for fever and malaise/fatigue.   HENT:  Negative for nosebleeds.    Cardiovascular:  Negative for chest pain, dyspnea on exertion and palpitations.   Respiratory:  Negative for cough and shortness of breath.    Musculoskeletal:  Negative for back pain, muscle cramps, muscle weakness and myalgias.   Gastrointestinal:  Negative for abdominal pain.   Neurological:  Negative for dizziness.        Physical Exam  Blood pressure (!) 140/68, pulse 58, height 1.575 m (5' 2\"), weight 84.8 kg (187 lb).  Physical Exam  HENT:      Head: Normocephalic and atraumatic.   Eyes:      Extraocular Movements: Extraocular movements intact.   Cardiovascular:      Rate and Rhythm: Normal rate and regular rhythm.      Heart sounds: No murmur

## 2025-04-24 ENCOUNTER — TELEMEDICINE (OUTPATIENT)
Dept: INTERNAL MEDICINE CLINIC | Facility: CLINIC | Age: 60
End: 2025-04-24
Payer: COMMERCIAL

## 2025-04-24 ENCOUNTER — E-VISIT (OUTPATIENT)
Dept: INTERNAL MEDICINE CLINIC | Facility: CLINIC | Age: 60
End: 2025-04-24

## 2025-04-24 DIAGNOSIS — J01.10 ACUTE NON-RECURRENT FRONTAL SINUSITIS: Primary | ICD-10-CM

## 2025-04-24 DIAGNOSIS — R05.1 ACUTE COUGH: Primary | ICD-10-CM

## 2025-04-24 PROCEDURE — 99213 OFFICE O/P EST LOW 20 MIN: CPT | Performed by: NURSE PRACTITIONER

## 2025-04-24 RX ORDER — AZITHROMYCIN 250 MG/1
TABLET, FILM COATED ORAL
Qty: 6 TABLET | Refills: 0 | Status: SHIPPED | OUTPATIENT
Start: 2025-04-24 | End: 2025-05-04

## 2025-04-24 RX ORDER — BENZONATATE 200 MG/1
200 CAPSULE ORAL 3 TIMES DAILY PRN
Qty: 30 CAPSULE | Refills: 0 | Status: SHIPPED | OUTPATIENT
Start: 2025-04-24 | End: 2025-05-04

## 2025-04-24 ASSESSMENT — ENCOUNTER SYMPTOMS
COUGH: 1
SINUS PRESSURE: 1
SHORTNESS OF BREATH: 0
VOMITING: 0
ABDOMINAL PAIN: 0
NAUSEA: 0
WHEEZING: 0
RHINORRHEA: 1
SORE THROAT: 1

## 2025-04-24 ASSESSMENT — LIFESTYLE VARIABLES
SMOKING_STATUS: NO, I'M A FORMER SMOKER
SMOKING_STATUS: NO, I'M A FORMER SMOKER
SMOKING_YEARS: 5
PACKS_PER_DAY: 1
PACKS_PER_DAY: 1
SMOKING_YEARS: 5

## 2025-04-24 NOTE — PROGRESS NOTES
Valeria Langston, was evaluated through a synchronous (real-time) audio-video encounter. The patient (or guardian if applicable) is aware that this is a billable service, which includes applicable co-pays. This Virtual Visit was conducted with patient's (and/or legal guardian's) consent. Patient identification was verified, and a caregiver was present when appropriate.   The patient was located at Home: [address] [place] [address] [place] [id number]  Provider was located at Facility (Appt Dept): [address] [id number]  Confirm you are appropriately licensed, registered, or certified to deliver care in the state where the patient is located as indicated above. If you are not or unsure, please re-schedule the visit: Yes, I confirm.     Valeria Langston (:  1965) is a Established patient, presenting virtually for evaluation of the following:      Below is the assessment and plan developed based on review of pertinent history, physical exam, labs, studies, and medications.     Assessment & Plan  Acute non-recurrent frontal sinusitis    Given duration of symptoms and lack of improvement, recommend treatment for bacterial sinusitis. Medications as directed. Call if symptoms worsen or fail to improve.     Orders:    azithromycin (ZITHROMAX) 250 MG tablet; 500mg on day 1 followed by 250mg on days 2 - 5    benzonatate (TESSALON) 200 MG capsule; Take 1 capsule by mouth 3 times daily as needed for Cough      Return if symptoms worsen or fail to improve.       Subjective   HPI  Patient seen virtually today with complaint of cough and congestion. Symptoms started about 10 days ago. Associated symptoms include fatigue, chills, sinus pressure, postnasal drainage, rhinorrhea, scratchy throat and ear fullness/pressure.   She denies any fever, SOB or wheezing. She has been taking Zyrtec, Flonase, Benadryl, Sudafed, Mucinex, Tylenol and Delsym but reports only temporary improvement. No

## 2025-05-02 DIAGNOSIS — E78.2 MIXED HYPERLIPIDEMIA: ICD-10-CM

## 2025-05-02 DIAGNOSIS — I24.89 DEMAND ISCHEMIA (HCC): ICD-10-CM

## 2025-05-02 DIAGNOSIS — I10 ESSENTIAL HYPERTENSION: ICD-10-CM

## 2025-05-02 DIAGNOSIS — R45.89 ANXIETY ABOUT HEALTH: ICD-10-CM

## 2025-05-02 RX ORDER — FLUTICASONE PROPIONATE 50 MCG
2 SPRAY, SUSPENSION (ML) NASAL DAILY
Qty: 48 G | Refills: 3 | OUTPATIENT
Start: 2025-05-02

## 2025-05-20 ENCOUNTER — HOSPITAL ENCOUNTER (OUTPATIENT)
Dept: MAMMOGRAPHY | Age: 60
Discharge: HOME | End: 2025-05-23
Attending: INTERNAL MEDICINE
Payer: COMMERCIAL

## 2025-05-20 VITALS — HEIGHT: 61 IN | BODY MASS INDEX: 34.36 KG/M2 | WEIGHT: 182 LBS

## 2025-05-20 DIAGNOSIS — R92.8 ABNORMAL SCREENING MAMMOGRAM: ICD-10-CM

## 2025-05-20 PROCEDURE — G0279 TOMOSYNTHESIS, MAMMO: HCPCS

## 2025-05-20 PROCEDURE — 76642 ULTRASOUND BREAST LIMITED: CPT

## 2025-05-21 ENCOUNTER — RESULTS FOLLOW-UP (OUTPATIENT)
Dept: INTERNAL MEDICINE CLINIC | Facility: CLINIC | Age: 60
End: 2025-05-21

## 2025-05-21 DIAGNOSIS — Z12.31 ENCOUNTER FOR SCREENING MAMMOGRAM FOR MALIGNANT NEOPLASM OF BREAST: ICD-10-CM

## 2025-05-21 NOTE — RESULT ENCOUNTER NOTE
No suspicious findings on left diagnostic mammogram and targeted left breast  ultrasound.    RECOMMENDATION:  Screening mammogram in 6 months.  Orders placed.    RANDY MELENDREZ MD

## 2025-05-21 NOTE — RESULT ENCOUNTER NOTE
No suspicious findings on left diagnostic mammogram and targeted left breast  ultrasound.    RECOMMENDATION:  Screening mammogram in 6 months.  Orders for screening mammography placed earlier today, to be completed approx 11/2025.      RANDY MELENDREZ MD

## 2025-05-23 RX ORDER — ESTRADIOL 0.1 MG/D
1 FILM, EXTENDED RELEASE TRANSDERMAL
Qty: 8 PATCH | Refills: 3 | Status: SHIPPED | OUTPATIENT
Start: 2025-05-26

## 2025-05-23 NOTE — TELEPHONE ENCOUNTER
Refills have been requested for the following medications:         estradiol (VIVELLE) 0.1 MG/24HR

## 2025-07-18 DIAGNOSIS — E78.2 MIXED HYPERLIPIDEMIA: ICD-10-CM

## 2025-07-18 DIAGNOSIS — I10 ESSENTIAL HYPERTENSION: ICD-10-CM

## 2025-07-21 RX ORDER — TICAGRELOR 90 MG/1
90 TABLET, FILM COATED ORAL 2 TIMES DAILY
Qty: 180 TABLET | Refills: 3 | Status: SHIPPED | OUTPATIENT
Start: 2025-07-21

## 2025-07-21 NOTE — TELEPHONE ENCOUNTER
Refill Request     Last Seen: 4/24/2025    Last Written: Ordered On: 12/12/2024   Disp-180 tablet, R-3   Next Appointment:   Future Appointments   Date Time Provider Department Center   8/11/2025  8:30 AM [facility]   8/19/2025  1:40 PM Larry Lee MD [facility]   9/25/2025  4:30 PM Guillermo Townsend MD [facility]             Requested Prescriptions     Pending Prescriptions Disp Refills    ticagrelor (BRILINTA) 90 MG TABS tablet 180 tablet 3     Sig: Take 1 tablet by mouth 2 times daily

## 2025-08-11 ENCOUNTER — LAB (OUTPATIENT)
Dept: INTERNAL MEDICINE CLINIC | Facility: CLINIC | Age: 60
End: 2025-08-11

## 2025-08-11 DIAGNOSIS — I24.89 DEMAND ISCHEMIA (HCC): ICD-10-CM

## 2025-08-11 DIAGNOSIS — I10 ESSENTIAL HYPERTENSION: ICD-10-CM

## 2025-08-11 DIAGNOSIS — E78.2 MIXED HYPERLIPIDEMIA: ICD-10-CM

## 2025-08-11 DIAGNOSIS — R45.89 ANXIETY ABOUT HEALTH: ICD-10-CM

## 2025-08-11 LAB
ALBUMIN SERPL-MCNC: 4 G/DL (ref 3.2–4.6)
ALBUMIN/GLOB SERPL: 1.2 (ref 1–1.9)
ALP SERPL-CCNC: 61 U/L (ref 35–104)
ALT SERPL-CCNC: 32 U/L (ref 8–45)
ANION GAP SERPL CALC-SCNC: 11 MMOL/L (ref 7–16)
APPEARANCE UR: CLEAR
AST SERPL-CCNC: 23 U/L (ref 15–37)
BACTERIA URNS QL MICRO: 0 /HPF
BASOPHILS # BLD: 0.06 K/UL (ref 0–0.2)
BASOPHILS NFR BLD: 0.9 % (ref 0–2)
BILIRUB SERPL-MCNC: 0.6 MG/DL (ref 0–1.2)
BILIRUB UR QL: NEGATIVE
BUN SERPL-MCNC: 9 MG/DL (ref 8–23)
CALCIUM SERPL-MCNC: 9.2 MG/DL (ref 8.8–10.2)
CASTS URNS QL MICRO: ABNORMAL /LPF
CHLORIDE SERPL-SCNC: 99 MMOL/L (ref 98–107)
CHOLEST SERPL-MCNC: 130 MG/DL (ref 0–200)
CO2 SERPL-SCNC: 26 MMOL/L (ref 20–29)
COLOR UR: ABNORMAL
CREAT SERPL-MCNC: 0.74 MG/DL (ref 0.6–1.1)
DIFFERENTIAL METHOD BLD: NORMAL
EOSINOPHIL # BLD: 0.13 K/UL (ref 0–0.8)
EOSINOPHIL NFR BLD: 2 % (ref 0.5–7.8)
EPI CELLS #/AREA URNS HPF: ABNORMAL /HPF
ERYTHROCYTE [DISTWIDTH] IN BLOOD BY AUTOMATED COUNT: 11.9 % (ref 11.9–14.6)
EST. AVERAGE GLUCOSE BLD GHB EST-MCNC: 124 MG/DL
GLOBULIN SER CALC-MCNC: 3.3 G/DL (ref 2.3–3.5)
GLUCOSE SERPL-MCNC: 100 MG/DL (ref 70–99)
GLUCOSE UR STRIP.AUTO-MCNC: NEGATIVE MG/DL
HBA1C MFR BLD: 6 % (ref 0–5.6)
HCT VFR BLD AUTO: 40.9 % (ref 35.8–46.3)
HDLC SERPL-MCNC: 29 MG/DL (ref 40–60)
HDLC SERPL: 4.5 (ref 0–5)
HGB BLD-MCNC: 13.5 G/DL (ref 11.7–15.4)
HGB UR QL STRIP: ABNORMAL
IMM GRANULOCYTES # BLD AUTO: 0.03 K/UL (ref 0–0.5)
IMM GRANULOCYTES NFR BLD AUTO: 0.5 % (ref 0–5)
KETONES UR QL STRIP.AUTO: NEGATIVE MG/DL
LDLC SERPL CALC-MCNC: 57 MG/DL (ref 0–100)
LEUKOCYTE ESTERASE UR QL STRIP.AUTO: NEGATIVE
LYMPHOCYTES # BLD: 1.67 K/UL (ref 0.5–4.6)
LYMPHOCYTES NFR BLD: 25.4 % (ref 13–44)
MCH RBC QN AUTO: 29.8 PG (ref 26.1–32.9)
MCHC RBC AUTO-ENTMCNC: 33 G/DL (ref 31.4–35)
MCV RBC AUTO: 90.3 FL (ref 82–102)
MONOCYTES # BLD: 0.53 K/UL (ref 0.1–1.3)
MONOCYTES NFR BLD: 8.1 % (ref 4–12)
NEUTS SEG # BLD: 4.15 K/UL (ref 1.7–8.2)
NEUTS SEG NFR BLD: 63.1 % (ref 43–78)
NITRITE UR QL STRIP.AUTO: NEGATIVE
NRBC # BLD: 0 K/UL (ref 0–0.2)
PH UR STRIP: 7.5 (ref 5–9)
PLATELET # BLD AUTO: 299 K/UL (ref 150–450)
PMV BLD AUTO: 11.3 FL (ref 9.4–12.3)
POTASSIUM SERPL-SCNC: 4.1 MMOL/L (ref 3.5–5.1)
PROT SERPL-MCNC: 7.3 G/DL (ref 6.3–8.2)
PROT UR STRIP-MCNC: NEGATIVE MG/DL
RBC # BLD AUTO: 4.53 M/UL (ref 4.05–5.2)
RBC #/AREA URNS HPF: ABNORMAL /HPF
SODIUM SERPL-SCNC: 137 MMOL/L (ref 136–145)
SP GR UR REFRACTOMETRY: <1.005 (ref 1–1.02)
TRIGL SERPL-MCNC: 219 MG/DL (ref 0–150)
TSH, 3RD GENERATION: 1.13 UIU/ML (ref 0.27–4.2)
UROBILINOGEN UR QL STRIP.AUTO: 0.2 EU/DL (ref 0.2–1)
VLDLC SERPL CALC-MCNC: 44 MG/DL (ref 6–23)
WBC # BLD AUTO: 6.6 K/UL (ref 4.3–11.1)
WBC URNS QL MICRO: ABNORMAL /HPF

## 2025-08-19 ENCOUNTER — OFFICE VISIT (OUTPATIENT)
Dept: INTERNAL MEDICINE CLINIC | Facility: CLINIC | Age: 60
End: 2025-08-19
Payer: COMMERCIAL

## 2025-08-19 VITALS
WEIGHT: 186 LBS | HEIGHT: 62 IN | DIASTOLIC BLOOD PRESSURE: 78 MMHG | BODY MASS INDEX: 34.23 KG/M2 | OXYGEN SATURATION: 98 % | HEART RATE: 57 BPM | SYSTOLIC BLOOD PRESSURE: 132 MMHG | TEMPERATURE: 98.1 F

## 2025-08-19 DIAGNOSIS — Z00.00 ENCOUNTER FOR WELL ADULT EXAM WITHOUT ABNORMAL FINDINGS: Primary | ICD-10-CM

## 2025-08-19 DIAGNOSIS — I25.118 CORONARY ARTERY DISEASE OF NATIVE ARTERY OF NATIVE HEART WITH STABLE ANGINA PECTORIS: Chronic | ICD-10-CM

## 2025-08-19 DIAGNOSIS — E78.2 MIXED HYPERLIPIDEMIA: ICD-10-CM

## 2025-08-19 DIAGNOSIS — J30.9 ALLERGIC RHINITIS, UNSPECIFIED SEASONALITY, UNSPECIFIED TRIGGER: ICD-10-CM

## 2025-08-19 DIAGNOSIS — I10 ESSENTIAL HYPERTENSION: ICD-10-CM

## 2025-08-19 DIAGNOSIS — Z78.0 POST-MENOPAUSAL: ICD-10-CM

## 2025-08-19 DIAGNOSIS — R45.89 ANXIETY ABOUT HEALTH: ICD-10-CM

## 2025-08-19 DIAGNOSIS — I24.89 DEMAND ISCHEMIA (HCC): ICD-10-CM

## 2025-08-19 PROCEDURE — 3078F DIAST BP <80 MM HG: CPT | Performed by: INTERNAL MEDICINE

## 2025-08-19 PROCEDURE — 99396 PREV VISIT EST AGE 40-64: CPT | Performed by: INTERNAL MEDICINE

## 2025-08-19 PROCEDURE — 3075F SYST BP GE 130 - 139MM HG: CPT | Performed by: INTERNAL MEDICINE

## 2025-08-19 RX ORDER — FLUTICASONE PROPIONATE 50 MCG
2 SPRAY, SUSPENSION (ML) NASAL DAILY
Qty: 16 G | Refills: 3 | Status: SHIPPED | OUTPATIENT
Start: 2025-08-19

## 2025-08-19 RX ORDER — ASPIRIN 81 MG/1
81 TABLET, CHEWABLE ORAL DAILY
Qty: 90 TABLET | Refills: 3 | Status: SHIPPED | OUTPATIENT
Start: 2025-08-19

## 2025-08-19 RX ORDER — ESTRADIOL 0.1 MG/D
1 FILM, EXTENDED RELEASE TRANSDERMAL
Qty: 8 PATCH | Refills: 3 | Status: SHIPPED | OUTPATIENT
Start: 2025-08-21

## 2025-08-19 RX ORDER — ROSUVASTATIN CALCIUM 20 MG/1
20 TABLET, COATED ORAL NIGHTLY
Qty: 90 TABLET | Refills: 3 | Status: SHIPPED | OUTPATIENT
Start: 2025-08-19

## 2025-08-19 RX ORDER — BUSPIRONE HYDROCHLORIDE 7.5 MG/1
7.5 TABLET ORAL PRN
Qty: 60 TABLET | Refills: 5 | Status: SHIPPED | OUTPATIENT
Start: 2025-08-19

## 2025-08-19 RX ORDER — AMLODIPINE BESYLATE 5 MG/1
5 TABLET ORAL DAILY
Qty: 90 TABLET | Refills: 3 | Status: SHIPPED | OUTPATIENT
Start: 2025-08-19

## 2025-08-19 RX ORDER — LOSARTAN POTASSIUM 50 MG/1
50 TABLET ORAL DAILY
Qty: 90 TABLET | Refills: 3 | Status: SHIPPED | OUTPATIENT
Start: 2025-08-19

## 2025-08-19 SDOH — ECONOMIC STABILITY: FOOD INSECURITY: WITHIN THE PAST 12 MONTHS, THE FOOD YOU BOUGHT JUST DIDN'T LAST AND YOU DIDN'T HAVE MONEY TO GET MORE.: NEVER TRUE

## 2025-08-19 SDOH — ECONOMIC STABILITY: FOOD INSECURITY: WITHIN THE PAST 12 MONTHS, YOU WORRIED THAT YOUR FOOD WOULD RUN OUT BEFORE YOU GOT MONEY TO BUY MORE.: NEVER TRUE

## 2025-08-19 ASSESSMENT — PATIENT HEALTH QUESTIONNAIRE - PHQ9
2. FEELING DOWN, DEPRESSED OR HOPELESS: NOT AT ALL
SUM OF ALL RESPONSES TO PHQ QUESTIONS 1-9: 0
SUM OF ALL RESPONSES TO PHQ QUESTIONS 1-9: 0
1. LITTLE INTEREST OR PLEASURE IN DOING THINGS: NOT AT ALL
SUM OF ALL RESPONSES TO PHQ QUESTIONS 1-9: 0
SUM OF ALL RESPONSES TO PHQ QUESTIONS 1-9: 0

## 2025-08-19 ASSESSMENT — ENCOUNTER SYMPTOMS
SHORTNESS OF BREATH: 0
CHEST TIGHTNESS: 0

## (undated) DEVICE — DEVICE COMPR REG 24 CM VASC BND

## (undated) DEVICE — CATHETER COR DIAG PIGTAILS PIG 145 CRV 5FR 110CM 6 SIDE H

## (undated) DEVICE — CATHETER GUID JR5 0.070 INX6 FRX100 CM VISTA BRT TIP

## (undated) DEVICE — GLIDESHEATH SLENDER STAINLESS STEEL KIT: Brand: GLIDESHEATH SLENDER

## (undated) DEVICE — RUNTHROUGH NS EXTRA FLOPPY PTCA GUIDEWIRE: Brand: RUNTHROUGH

## (undated) DEVICE — GUIDEWIRE 035IN 210CM PTFE COAT FIX COR J TIP 15MM FIRM BODY

## (undated) DEVICE — CATH BLLN ANGIO 3.50X12MM NC EUPHORIA RX

## (undated) DEVICE — COPILOT BLEEDBACK CONTROL VALVE: Brand: COPILOT

## (undated) DEVICE — CATHETER COR DIAG 4.0 5FR 110CM 2 SIDE H

## (undated) DEVICE — Device: Brand: PROWATER